# Patient Record
Sex: FEMALE | Race: BLACK OR AFRICAN AMERICAN | NOT HISPANIC OR LATINO | Employment: OTHER | URBAN - METROPOLITAN AREA
[De-identification: names, ages, dates, MRNs, and addresses within clinical notes are randomized per-mention and may not be internally consistent; named-entity substitution may affect disease eponyms.]

---

## 2017-05-11 ENCOUNTER — APPOINTMENT (OUTPATIENT)
Dept: CT IMAGING | Facility: CLINIC | Age: 67
DRG: 581 | End: 2017-05-11
Attending: EMERGENCY MEDICINE
Payer: COMMERCIAL

## 2017-05-11 ENCOUNTER — HOSPITAL ENCOUNTER (INPATIENT)
Facility: CLINIC | Age: 67
LOS: 2 days | Discharge: HOME | DRG: 581 | End: 2017-05-13
Attending: EMERGENCY MEDICINE | Admitting: INTERNAL MEDICINE
Payer: COMMERCIAL

## 2017-05-11 ENCOUNTER — APPOINTMENT (OUTPATIENT)
Dept: GENERAL RADIOLOGY | Facility: CLINIC | Age: 67
DRG: 581 | End: 2017-05-11
Attending: EMERGENCY MEDICINE
Payer: COMMERCIAL

## 2017-05-11 DIAGNOSIS — I48.91 ATRIAL FIBRILLATION, NEW ONSET (H): ICD-10-CM

## 2017-05-11 DIAGNOSIS — J98.59 MEDIASTINAL MASS: ICD-10-CM

## 2017-05-11 LAB
ALBUMIN SERPL-MCNC: 3.4 G/DL (ref 3.4–5)
ALP SERPL-CCNC: 68 U/L (ref 40–150)
ALT SERPL W P-5'-P-CCNC: 12 U/L (ref 0–50)
ANION GAP SERPL CALCULATED.3IONS-SCNC: 6 MMOL/L (ref 3–14)
AST SERPL W P-5'-P-CCNC: 11 U/L (ref 0–45)
BASOPHILS # BLD AUTO: 0 10E9/L (ref 0–0.2)
BASOPHILS NFR BLD AUTO: 0.6 %
BILIRUB SERPL-MCNC: 0.3 MG/DL (ref 0.2–1.3)
BUN SERPL-MCNC: 13 MG/DL (ref 7–30)
CALCIUM SERPL-MCNC: 9.1 MG/DL (ref 8.5–10.1)
CHLORIDE SERPL-SCNC: 109 MMOL/L (ref 94–109)
CO2 SERPL-SCNC: 27 MMOL/L (ref 20–32)
CREAT SERPL-MCNC: 0.6 MG/DL (ref 0.52–1.04)
DIFFERENTIAL METHOD BLD: NORMAL
EOSINOPHIL # BLD AUTO: 0.3 10E9/L (ref 0–0.7)
EOSINOPHIL NFR BLD AUTO: 5.3 %
ERYTHROCYTE [DISTWIDTH] IN BLOOD BY AUTOMATED COUNT: 12.6 % (ref 10–15)
GFR SERPL CREATININE-BSD FRML MDRD: ABNORMAL ML/MIN/1.7M2
GLUCOSE BLDC GLUCOMTR-MCNC: 145 MG/DL (ref 70–99)
GLUCOSE SERPL-MCNC: 148 MG/DL (ref 70–99)
HCT VFR BLD AUTO: 42.1 % (ref 35–47)
HGB BLD-MCNC: 14.3 G/DL (ref 11.7–15.7)
IMM GRANULOCYTES # BLD: 0 10E9/L (ref 0–0.4)
IMM GRANULOCYTES NFR BLD: 0.2 %
INR PPP: 0.95 (ref 0.86–1.14)
LYMPHOCYTES # BLD AUTO: 2.8 10E9/L (ref 0.8–5.3)
LYMPHOCYTES NFR BLD AUTO: 50.6 %
MCH RBC QN AUTO: 31 PG (ref 26.5–33)
MCHC RBC AUTO-ENTMCNC: 34 G/DL (ref 31.5–36.5)
MCV RBC AUTO: 91 FL (ref 78–100)
MONOCYTES # BLD AUTO: 0.4 10E9/L (ref 0–1.3)
MONOCYTES NFR BLD AUTO: 7.2 %
NEUTROPHILS # BLD AUTO: 2 10E9/L (ref 1.6–8.3)
NEUTROPHILS NFR BLD AUTO: 36.1 %
NRBC # BLD AUTO: 0 10*3/UL
NRBC BLD AUTO-RTO: 0 /100
NT-PROBNP SERPL-MCNC: 2069 PG/ML (ref 0–900)
PLATELET # BLD AUTO: 205 10E9/L (ref 150–450)
POTASSIUM SERPL-SCNC: 4.1 MMOL/L (ref 3.4–5.3)
PROT SERPL-MCNC: 7.5 G/DL (ref 6.8–8.8)
RBC # BLD AUTO: 4.62 10E12/L (ref 3.8–5.2)
SODIUM SERPL-SCNC: 142 MMOL/L (ref 133–144)
TROPONIN I SERPL-MCNC: NORMAL UG/L (ref 0–0.04)
TSH SERPL DL<=0.05 MIU/L-ACNC: 0.99 MU/L (ref 0.4–4)
WBC # BLD AUTO: 5.5 10E9/L (ref 4–11)

## 2017-05-11 PROCEDURE — 71260 CT THORAX DX C+: CPT

## 2017-05-11 PROCEDURE — 12000001 ZZH R&B MED SURG/OB UMMC

## 2017-05-11 PROCEDURE — 83880 ASSAY OF NATRIURETIC PEPTIDE: CPT | Performed by: EMERGENCY MEDICINE

## 2017-05-11 PROCEDURE — 93005 ELECTROCARDIOGRAM TRACING: CPT | Performed by: EMERGENCY MEDICINE

## 2017-05-11 PROCEDURE — 99222 1ST HOSP IP/OBS MODERATE 55: CPT | Mod: AI | Performed by: INTERNAL MEDICINE

## 2017-05-11 PROCEDURE — 25000128 H RX IP 250 OP 636: Performed by: EMERGENCY MEDICINE

## 2017-05-11 PROCEDURE — 85025 COMPLETE CBC W/AUTO DIFF WBC: CPT | Performed by: EMERGENCY MEDICINE

## 2017-05-11 PROCEDURE — 85610 PROTHROMBIN TIME: CPT | Performed by: EMERGENCY MEDICINE

## 2017-05-11 PROCEDURE — 84443 ASSAY THYROID STIM HORMONE: CPT | Performed by: EMERGENCY MEDICINE

## 2017-05-11 PROCEDURE — 99285 EMERGENCY DEPT VISIT HI MDM: CPT | Mod: 25 | Performed by: EMERGENCY MEDICINE

## 2017-05-11 PROCEDURE — 25500064 ZZH RX 255 OP 636: Performed by: EMERGENCY MEDICINE

## 2017-05-11 PROCEDURE — 00000146 ZZHCL STATISTIC GLUCOSE BY METER IP

## 2017-05-11 PROCEDURE — 80053 COMPREHEN METABOLIC PANEL: CPT | Performed by: EMERGENCY MEDICINE

## 2017-05-11 PROCEDURE — 84484 ASSAY OF TROPONIN QUANT: CPT | Performed by: EMERGENCY MEDICINE

## 2017-05-11 PROCEDURE — 99285 EMERGENCY DEPT VISIT HI MDM: CPT | Mod: Z6 | Performed by: EMERGENCY MEDICINE

## 2017-05-11 PROCEDURE — 71020 XR CHEST 2 VW: CPT

## 2017-05-11 PROCEDURE — 25000125 ZZHC RX 250: Performed by: EMERGENCY MEDICINE

## 2017-05-11 RX ORDER — AMOXICILLIN 250 MG
1-2 CAPSULE ORAL 2 TIMES DAILY PRN
Status: DISCONTINUED | OUTPATIENT
Start: 2017-05-11 | End: 2017-05-13 | Stop reason: HOSPADM

## 2017-05-11 RX ORDER — NICOTINE POLACRILEX 4 MG
15-30 LOZENGE BUCCAL
Status: DISCONTINUED | OUTPATIENT
Start: 2017-05-11 | End: 2017-05-13 | Stop reason: HOSPADM

## 2017-05-11 RX ORDER — NALOXONE HYDROCHLORIDE 0.4 MG/ML
.1-.4 INJECTION, SOLUTION INTRAMUSCULAR; INTRAVENOUS; SUBCUTANEOUS
Status: DISCONTINUED | OUTPATIENT
Start: 2017-05-11 | End: 2017-05-13 | Stop reason: HOSPADM

## 2017-05-11 RX ORDER — ACETAMINOPHEN 325 MG/1
650 TABLET ORAL EVERY 4 HOURS PRN
Status: DISCONTINUED | OUTPATIENT
Start: 2017-05-11 | End: 2017-05-13 | Stop reason: HOSPADM

## 2017-05-11 RX ORDER — DEXTROSE MONOHYDRATE 25 G/50ML
25-50 INJECTION, SOLUTION INTRAVENOUS
Status: DISCONTINUED | OUTPATIENT
Start: 2017-05-11 | End: 2017-05-13 | Stop reason: HOSPADM

## 2017-05-11 RX ORDER — ONDANSETRON 2 MG/ML
4 INJECTION INTRAMUSCULAR; INTRAVENOUS EVERY 6 HOURS PRN
Status: DISCONTINUED | OUTPATIENT
Start: 2017-05-11 | End: 2017-05-13 | Stop reason: HOSPADM

## 2017-05-11 RX ORDER — ONDANSETRON 4 MG/1
4 TABLET, ORALLY DISINTEGRATING ORAL EVERY 6 HOURS PRN
Status: DISCONTINUED | OUTPATIENT
Start: 2017-05-11 | End: 2017-05-13 | Stop reason: HOSPADM

## 2017-05-11 RX ORDER — IOPAMIDOL 755 MG/ML
100 INJECTION, SOLUTION INTRAVASCULAR ONCE
Status: COMPLETED | OUTPATIENT
Start: 2017-05-11 | End: 2017-05-11

## 2017-05-11 RX ORDER — BISACODYL 10 MG
10 SUPPOSITORY, RECTAL RECTAL DAILY PRN
Status: DISCONTINUED | OUTPATIENT
Start: 2017-05-11 | End: 2017-05-13 | Stop reason: HOSPADM

## 2017-05-11 RX ORDER — SODIUM CHLORIDE 9 MG/ML
1000 INJECTION, SOLUTION INTRAVENOUS CONTINUOUS
Status: DISCONTINUED | OUTPATIENT
Start: 2017-05-11 | End: 2017-05-13 | Stop reason: HOSPADM

## 2017-05-11 RX ADMIN — SODIUM CHLORIDE 80 ML: 9 INJECTION, SOLUTION INTRAVENOUS at 20:25

## 2017-05-11 RX ADMIN — SODIUM CHLORIDE 500 ML: 9 INJECTION, SOLUTION INTRAVENOUS at 19:35

## 2017-05-11 RX ADMIN — IOPAMIDOL 92 ML: 755 INJECTION, SOLUTION INTRAVENOUS at 20:24

## 2017-05-11 NOTE — IP AVS SNAPSHOT
[address] [address]    Phone:  [phone]                                       After Visit Summary   5/11/2017    Jf Schulz    MRN: [id number]           After Visit Summary Signature Page     I have received my discharge instructions, and my questions have been answered. I have discussed any challenges I see with this plan with the nurse or doctor.    ..........................................................................................................................................  Patient/Patient Representative Signature      ..........................................................................................................................................  Patient Representative Print Name and Relationship to Patient    ..................................................               ................................................  Date                                            Time    ..........................................................................................................................................  Reviewed by Signature/Title    ...................................................              ..............................................  Date                                                            Time

## 2017-05-11 NOTE — IP AVS SNAPSHOT
MRN:[id number]                      After Visit Summary   5/11/2017    Jf Schulz    MRN: [id number]           Thank you!     Thank you for choosing [facility] for your care. Our goal is always to provide you with excellent care. Hearing back from our patients is one way we can continue to improve our services. Please take a few minutes to complete the written survey that you may receive in the mail after you visit with us. Thank you!        Patient Information     Date Of Birth          1950        Designated Caregiver       Most Recent Value    Caregiver    Will someone help with your care after discharge? yes    Name of designated caregiver Elton Payne    Phone number of caregiver [phone]    Caregiver address [address], [place] [address]      About your hospital stay     You were admitted on:  May 11, 2017 You last received care in the:  [address] [facility]    You were discharged on:  May 13, 2017        Reason for your hospital stay       You were hospitalized for a mass that was discovered in your mediastinum (middle of your chest, not in lungs). You were also noted to have an irregular heart rhythm called atrial fibrillation. A sample biopsy was taken of the mass (further studies from this are pending). You were started on a new medication to control your heart rate.                  Who to Call     For medical emergencies, please call 911.  For non-urgent questions about your medical care, please call your primary care provider or clinic, [phone]  For questions related to your surgery, please call your surgery clinic        Attending Provider     Provider Specialty    Dmitriy Pérez MD Emergency Medicine    Ravindra Mondragon MD Internal Medicine    Gorge Celeste MD Pediatrics       Primary Care Provider Office Phone # Fax #    Samuel Dubose [phone]       [facility] [address] [address]         When to contact  your care team       If you are feeling unwell or have new or concerning symptoms, contact your primary care doctor. If you have shortness of breath, chest pain, fevers and chills, come to emergency department                  After Care Instructions     Activity       Your activity upon discharge as tolerated            Diet       Follow this diet upon discharge: Regular diabetic diet            Discharge Instructions       1. Follow up appointments as above   2. Continue all previous home medications   3. New medication for heart rhythm is in discharge medications (metoprolol and you can  at [place] [facility]).                  Follow-up Appointments     Follow Up and recommended labs and tests       1. Follow up on Tuesday with your primary care provider Dr. Samuel Dubose at [facility]. He should get Info[employer] to get your laboratory results and help set up follow-up as needed.     2. The infectious disease doctors will follow the lab sample results at the [facility] and will contact you as well if something returns concerning for infection. You will be scheduled up for follow up with infectious disease as needed.     3. You should discuss new diagnosis of atrial fibrillation with your primary doctor. We discussed that there is a risk of stroke due to blood clots that can form in the heart with this abnormal heart rhythm. Given that we do not know definitely what the mass is in your chest, we would like to have a plan for this before considering starting blood thinners.      You were started on a medication to slow down your heart as sometimes your irregular heart rhythm can speed up and make you feel unwell.                  Pending Results     Date and Time Order Name Status Description    5/12/2017 1528 Nocardia culture In process     5/12/2017 1528 Fungus Culture, non-blood In process     5/12/2017 1528 AFB Stain Non Blood Preliminary     5/12/2017 1528 AFB Culture Non Blood  "Preliminary     2017 1509 Fluid Culture Aerobic Bacterial Preliminary     2017 0920 M Tuberculosis by Quantiferon ! Follow QTB collection process In process             Statement of Approval     Ordered          17 2066  I have reviewed and agree with all the recommendations and orders detailed in this document.  EFFECTIVE NOW     Approved and electronically signed by:  Dinesh Florian MD             Admission Information     Date & Time Provider Department Dept. Phone    2017 Gorge Celeste MD [address] [facility] [phone]      Your Vitals Were     Blood Pressure Pulse Temperature Respirations Height Weight    128/71 (BP Location: Left arm) 76 97  F (36.1  C) (Oral) 16 1.702 m (5' 7\") 79.4 kg (175 lb 1.6 oz)    Pulse Oximetry BMI (Body Mass Index)                97% 27.42 kg/m2          MyChart Information     [employer] lets you send messages to your doctor, view your test results, renew your prescriptions, schedule appointments and more. To sign up, go to [email or web address] . Click on \"Log in\" on the left side of the screen, which will take you to the Welcome page. Then click on \"Sign up Now\" on the right side of the page.     You will be asked to enter the access code listed below, as well as some personal information. Please follow the directions to create your username and password.     Your access code is: [email or web address]  Expires: 8/10/2017  2:58 PM     Your access code will  in 90 days. If you need help or a new code, please call your [place] clinic or [phone].        Care EveryWhere ID     This is your Care EveryWhere ID. This could be used by other organizations to access your [facility] medical records  [id number]           Review of your medicines      START taking        Dose / Directions    metoprolol 25 MG tablet   Commonly known as:  LOPRESSOR   Used for:  Atrial fibrillation, new onset (H)        Dose:  12.5 mg   Take 0.5 tablets (12.5 mg) by mouth 2 times " daily If well tolerated, should have further medication prescribed from primary doctor   Quantity:  30 tablet   Refills:  0         CONTINUE these medicines which have NOT CHANGED        Dose / Directions    insulin glargine 100 UNIT/ML injection   Commonly known as:  LANTUS        Dose:  10 Units   Inject 10 Units Subcutaneous At Bedtime   Refills:  0       METFORMIN HCL PO        Dose:  1000 mg   Take 1,000 mg by mouth daily (with breakfast)   Refills:  0       SIMVASTATIN PO        Dose:  10 mg   Take 10 mg by mouth At Bedtime   Refills:  0       vitamin D 42725 UNIT capsule   Commonly known as:  ERGOCALCIFEROL        Dose:  01514 Units   Take 50,000 Units by mouth Every Mon, Wed, Fri Morning   Refills:  0            Where to get your medicines      [facility] [place], [place] - [address]  [address] [facility] [address]     Phone:  [phone]     metoprolol 25 MG tablet                Protect others around you: Learn how to safely use, store and throw away your medicines at www.disposemymeds.org.             Medication List: This is a list of all your medications and when to take them. Check marks below indicate your daily home schedule. Keep this list as a reference.      Medications           Morning Afternoon Evening Bedtime As Needed    insulin glargine 100 UNIT/ML injection   Commonly known as:  LANTUS   Inject 10 Units Subcutaneous At Bedtime                                METFORMIN HCL PO   Take 1,000 mg by mouth daily (with breakfast)                                metoprolol 25 MG tablet   Commonly known as:  LOPRESSOR   Take 0.5 tablets (12.5 mg) by mouth 2 times daily If well tolerated, should have further medication prescribed from primary doctor   Last time this was given:  12.5 mg on 5/13/2017  9:04 AM                                SIMVASTATIN PO   Take 10 mg by mouth At Bedtime                                vitamin D 37066  UNIT capsule   Commonly known as:  ERGOCALCIFEROL   Take 50,000 Units by mouth Every Mon, Wed, Fri Morning

## 2017-05-11 NOTE — LETTER
[address] [facility]  [address] [address] [address]  Phone: [phone]    May 13, 2017        Jf Schulz  [address] [facility] [address]          To whom it may concern:    RE: Jf Schulz, mother of      Was hospitalized from 5/12/17 to 5/13/17. Her son was present during her care. This note should be presented to his work regarding absence given family situation.     Please contact me for questions or concerns.      Sincerely,      Dr. Maribeth Thompson

## 2017-05-11 NOTE — ED PROVIDER NOTES
History     Chief Complaint   Patient presents with     Palpitations     sent from [facility] for eval of irregular heartbeat. Pt c/o palpitations.     HPI  Jf Schulz is a 67 year old [place] female who presents with her daughter to the ER for evaluation.  The patient apparently was seen in the clinic earlier today and was found to be in atrial fibrillation and was sent here to the ER for further evaluation.  The patient has no previous history of atrial fibrillation.  The patient s history is one of diabetes but no previous heart history.  The patient states that she gets intermittent palpitations and states that this has been ongoing for the last year.  Patient states that she is mildly short of breath and has some slight LUJAN.  The patient states that some of these symptoms have been worsening over the last month.  The patient denies any chest pain or pressure currently.  Patient denies any vomiting, diarrhea, melena or bright blood per rectum.    I have reviewed the Medications, Allergies, Past Medical and Surgical History, and Social History in the Epic system.    History reviewed. No pertinent past medical history.    History reviewed. No pertinent surgical history.    No family history on file.    Social History   Substance Use Topics     Smoking status: Never Smoker     Smokeless tobacco: Not on file     Alcohol use No     Previous Medications    INSULIN GLARGINE (LANTUS) 100 UNIT/ML INJECTION    Inject 100 Units Subcutaneous At Bedtime    METFORMIN HCL PO    Take 1,000 mg by mouth 2 times daily (with meals)    PANTOPRAZOLE SODIUM PO    Take 60 mg by mouth    SIMVASTATIN PO    Take 10 mg by mouth At Bedtime      No Known Allergies    Review of Systems   All other systems reviewed and are negative.         Physical Exam   BP: 119/68  Pulse: 69 ( per pulse ox)  Temp: 94  F (34.4  C)  Resp: 16  SpO2: 99 %  Physical Exam   Constitutional: She is oriented to person, place, and time.   Elderly alert  conversant   HENT:   Head: Atraumatic.   Eyes: EOM are normal. Pupils are equal, round, and reactive to light.   Neck: Neck supple.   Cardiovascular:   Irregular   Pulmonary/Chest: Breath sounds normal.   Slight crackles in the bases   Abdominal: Soft. There is no tenderness. There is no rebound.   Musculoskeletal: She exhibits edema (trace). She exhibits no tenderness or deformity.   Neurological: She is alert and oriented to person, place, and time. No cranial nerve deficit.   Grossly intact and symmetric   Skin: Skin is warm.   Psychiatric: She has a normal mood and affect.       ED Course     ED Course     Procedures          Patient was placed on cardiac monitor and oximetry.    EKG revealed atrial fibrillation at a rate of 94 with no acute ST changes for ischemia.  This was read by me personally.  No old EKGs were available for review.    IV was established for blood draw and medication administration.    Chest x-ray was done and revealed a slightly widened mediastinum.  Chest CT was subsequently done to rule out ascending aortic dissection or aneurysm.    Results for orders placed or performed during the hospital encounter of 05/11/17   XR Chest 2 Views    Narrative    CHEST TWO VIEWS  5/11/2017 7:01 PM     HISTORY: Chest pain.    COMPARISON: None.      Impression    IMPRESSION: Mild elevation of right diaphragm. Poor visualization of  the right costophrenic sulcus on lateral view. Normal heart size and  pulmonary vascularity. Question of retrocardiac mass or dilatation of  the left atrium with elevation of the right mainstem bronchus.     JOVON MORTENSEN MD   Chest CT w IV contrast only, TRAUMA / DISSECTION    Narrative    CT CHEST WITH CONTRAST  5/11/2017 8:27 PM     HISTORY:   Chest pain and enlarged mediastinum on x-ray. Evaluate for  dissection.    TECHNIQUE:    Helical axial scans from lung apices through lung bases  with Isovue 370, 92 mL IV contrast. Radiation dose for this scan was  reduced using  automated exposure control, adjustment of the mA and/or  kV according to patient size, or iterative reconstruction technique.    COMPARISON:    None.    FINDINGS:    There is no evidence for aortic dissection or pulmonary  embolism. There is a large subcarinal mass lesion measuring up to 3.9  x 5.3 x 5.0 cm. Mass density is 25 Hounsfield units so it is not a  simple benign cyst. It causes mass effect on the posterior aspect of  the right main pulmonary artery with mild narrowing of this vessel.  The lesion also causes mass effect on the superior left atrium. No  other mediastinal mass lesions or adenopathy. Small amount of fibrosis  or platelike atelectasis posterior right lung base. The lungs are  otherwise clear. Visualized upper abdomen shows a probable 2 cm benign  cyst in the upper pole of the left kidney.      Impression    IMPRESSION:  1. No evidence for aortic dissection or other acute vascular  abnormality of the chest.  2. Large subcarinal soft tissue mass which is suspicious for neoplasm.  3. Probable 2 cm benign cyst upper pole left kidney.    CBC with platelets differential   Result Value Ref Range    WBC 5.5 4.0 - 11.0 10e9/L    RBC Count 4.62 3.8 - 5.2 10e12/L    Hemoglobin 14.3 11.7 - 15.7 g/dL    Hematocrit 42.1 35.0 - 47.0 %    MCV 91 78 - 100 fl    MCH 31.0 26.5 - 33.0 pg    MCHC 34.0 31.5 - 36.5 g/dL    RDW 12.6 10.0 - 15.0 %    Platelet Count 205 150 - 450 10e9/L    Diff Method Automated Method     % Neutrophils 36.1 %    % Lymphocytes 50.6 %    % Monocytes 7.2 %    % Eosinophils 5.3 %    % Basophils 0.6 %    % Immature Granulocytes 0.2 %    Nucleated RBCs 0 0 /100    Absolute Neutrophil 2.0 1.6 - 8.3 10e9/L    Absolute Lymphocytes 2.8 0.8 - 5.3 10e9/L    Absolute Monocytes 0.4 0.0 - 1.3 10e9/L    Absolute Eosinophils 0.3 0.0 - 0.7 10e9/L    Absolute Basophils 0.0 0.0 - 0.2 10e9/L    Abs Immature Granulocytes 0.0 0 - 0.4 10e9/L    Absolute Nucleated RBC 0.0    Troponin I   Result Value Ref Range     Troponin I ES  0.000 - 0.045 ug/L     <0.015  The 99th percentile for upper reference range is 0.045 ug/L.  Troponin values in   the range of 0.045 - 0.120 ug/L may be associated with risks of adverse   clinical events.     INR   Result Value Ref Range    INR 0.95 0.86 - 1.14   Comprehensive metabolic panel   Result Value Ref Range    Sodium 142 133 - 144 mmol/L    Potassium 4.1 3.4 - 5.3 mmol/L    Chloride 109 94 - 109 mmol/L    Carbon Dioxide 27 20 - 32 mmol/L    Anion Gap 6 3 - 14 mmol/L    Glucose 148 (H) 70 - 99 mg/dL    Urea Nitrogen 13 7 - 30 mg/dL    Creatinine 0.60 0.52 - 1.04 mg/dL    GFR Estimate >90  Non  GFR Calc   >60 mL/min/1.7m2    GFR Estimate If Black >90   GFR Calc   >60 mL/min/1.7m2    Calcium 9.1 8.5 - 10.1 mg/dL    Bilirubin Total 0.3 0.2 - 1.3 mg/dL    Albumin 3.4 3.4 - 5.0 g/dL    Protein Total 7.5 6.8 - 8.8 g/dL    Alkaline Phosphatase 68 40 - 150 U/L    ALT 12 0 - 50 U/L    AST 11 0 - 45 U/L   Nt probnp inpatient (BNP)   Result Value Ref Range    N-Terminal Pro BNP Inpatient 2069 (H) 0 - 900 pg/mL   TSH   Result Value Ref Range    TSH 0.99 0.40 - 4.00 mU/L   Glucose by meter   Result Value Ref Range    Glucose 145 (H) 70 - 99 mg/dL   EKG 12 lead   Result Value Ref Range    Interpretation ECG Click View Image link to view waveform and result        Labs Ordered and Resulted from Time of ED Arrival Up to the Time of Departure from the ED   COMPREHENSIVE METABOLIC PANEL - Abnormal; Notable for the following:        Result Value    Glucose 148 (*)     All other components within normal limits   NT PROBNP INPATIENT - Abnormal; Notable for the following:     N-Terminal Pro BNP Inpatient 2069 (*)     All other components within normal limits   GLUCOSE BY METER - Abnormal; Notable for the following:     Glucose 145 (*)     All other components within normal limits   CBC WITH PLATELETS DIFFERENTIAL   TROPONIN I   INR   TSH   GLUCOSE MONITOR NURSING POCT   DRUG  ABUSE SCREEN 6 CHEM DEP URINE ([facility])   ROUTINE UA WITH MICROSCOPIC REFLEX TO CULTURE   PULSE OXIMETRY NURSING   CARDIAC CONTINUOUS MONITORING   PERIPHERAL IV CATHETER       Assessments & Plan (with Medical Decision Making)     I have reviewed the nursing notes.    Findings of the chest CT and the EKG were discussed with family with  present.  Patient was discussed with thoracic surgery and with the medicine service on the [place].  Patient is in new onset A. fib with mild evidence of failure.  Patient will be admitted to the medicine service with most likely pulmonary, cardiology and/or thoracic surgery in consult.    I have reviewed the findings, diagnosis, and plan with the patient and the family.    Final diagnoses:   Atrial fibrillation, new onset (H)   Mediastinal mass     Dmitriy Pérez MD    I, Geno Donohue, am serving as a trained medical scribe to document services personally performed by Dmitriy Pérez MD, based on the provider's statements to me.      IDmitriy MD, was physically present and have reviewed and verified the accuracy of this note documented by Geno Donohue.       5/11/2017   [facility], [place], EMERGENCY DEPARTMENT     Dmitriy Pérez MD  05/11/17 8574

## 2017-05-12 ENCOUNTER — OFFICE VISIT (OUTPATIENT)
Dept: INTERPRETER SERVICES | Facility: CLINIC | Age: 67
End: 2017-05-12

## 2017-05-12 LAB
ALBUMIN UR-MCNC: NEGATIVE MG/DL
AMPHETAMINES UR QL SCN: NORMAL
APPEARANCE UR: CLEAR
BACTERIA SPEC CULT: NORMAL
BARBITURATES UR QL: NORMAL
BENZODIAZ UR QL: NORMAL
BILIRUB UR QL STRIP: NEGATIVE
CANNABINOIDS UR QL SCN: NORMAL
COCAINE UR QL: NORMAL
COLOR UR AUTO: YELLOW
ETHANOL UR QL SCN: NORMAL
GLUCOSE BLDC GLUCOMTR-MCNC: 108 MG/DL (ref 70–99)
GLUCOSE BLDC GLUCOMTR-MCNC: 116 MG/DL (ref 70–99)
GLUCOSE BLDC GLUCOMTR-MCNC: 137 MG/DL (ref 70–99)
GLUCOSE BLDC GLUCOMTR-MCNC: 139 MG/DL (ref 70–99)
GLUCOSE BLDC GLUCOMTR-MCNC: 162 MG/DL (ref 70–99)
GLUCOSE BLDC GLUCOMTR-MCNC: 171 MG/DL (ref 70–99)
GLUCOSE BLDC GLUCOMTR-MCNC: 282 MG/DL (ref 70–99)
GLUCOSE UR STRIP-MCNC: NEGATIVE MG/DL
GRAM STN SPEC: NORMAL
HBA1C MFR BLD: 8 % (ref 4.3–6)
HGB UR QL STRIP: NEGATIVE
INTERPRETATION ECG - MUSE: NORMAL
KETONES UR STRIP-MCNC: 5 MG/DL
KOH PREP SPEC: NORMAL
LACTATE BLD-SCNC: 1 MMOL/L (ref 0.7–2.1)
LDH SERPL L TO P-CCNC: 138 U/L (ref 81–234)
LEUKOCYTE ESTERASE UR QL STRIP: NEGATIVE
MICRO REPORT STATUS: NORMAL
NITRATE UR QL: NEGATIVE
OPIATES UR QL SCN: NORMAL
PH UR STRIP: 5.5 PH (ref 5–7)
RBC #/AREA URNS AUTO: <1 /HPF (ref 0–2)
SP GR UR STRIP: 1.02 (ref 1–1.03)
SPECIMEN SOURCE: NORMAL
SQUAMOUS #/AREA URNS AUTO: <1 /HPF (ref 0–1)
TRANS CELLS #/AREA URNS HPF: <1 /HPF (ref 0–1)
URN SPEC COLLECT METH UR: ABNORMAL
UROBILINOGEN UR STRIP-MCNC: NORMAL MG/DL (ref 0–2)
WBC #/AREA URNS AUTO: 2 /HPF (ref 0–2)

## 2017-05-12 PROCEDURE — 36415 COLL VENOUS BLD VENIPUNCTURE: CPT | Performed by: INTERNAL MEDICINE

## 2017-05-12 PROCEDURE — 80320 DRUG SCREEN QUANTALCOHOLS: CPT | Performed by: EMERGENCY MEDICINE

## 2017-05-12 PROCEDURE — 00000155 ZZHCL STATISTIC H-CELL BLOCK W/STAIN: Performed by: INTERNAL MEDICINE

## 2017-05-12 PROCEDURE — 25000132 ZZH RX MED GY IP 250 OP 250 PS 637: Performed by: INTERNAL MEDICINE

## 2017-05-12 PROCEDURE — 83036 HEMOGLOBIN GLYCOSYLATED A1C: CPT | Performed by: INTERNAL MEDICINE

## 2017-05-12 PROCEDURE — 81001 URINALYSIS AUTO W/SCOPE: CPT | Performed by: EMERGENCY MEDICINE

## 2017-05-12 PROCEDURE — 80307 DRUG TEST PRSMV CHEM ANLYZR: CPT | Performed by: EMERGENCY MEDICINE

## 2017-05-12 PROCEDURE — 31652 BRONCH EBUS SAMPLNG 1/2 NODE: CPT | Performed by: INTERNAL MEDICINE

## 2017-05-12 PROCEDURE — 00000146 ZZHCL STATISTIC GLUCOSE BY METER IP

## 2017-05-12 PROCEDURE — 87015 SPECIMEN INFECT AGNT CONCNTJ: CPT | Performed by: INTERNAL MEDICINE

## 2017-05-12 PROCEDURE — 99233 SBSQ HOSP IP/OBS HIGH 50: CPT | Mod: GC | Performed by: PEDIATRICS

## 2017-05-12 PROCEDURE — 25000128 H RX IP 250 OP 636: Performed by: INTERNAL MEDICINE

## 2017-05-12 PROCEDURE — T1013 SIGN LANG/ORAL INTERPRETER: HCPCS | Mod: U3

## 2017-05-12 PROCEDURE — 40000588 ZZH STATISTIC BRONCH IN ENDO (20 MIN) THERAPIST TIME

## 2017-05-12 PROCEDURE — 86480 TB TEST CELL IMMUN MEASURE: CPT | Performed by: PEDIATRICS

## 2017-05-12 PROCEDURE — 87116 MYCOBACTERIA CULTURE: CPT | Performed by: INTERNAL MEDICINE

## 2017-05-12 PROCEDURE — 87210 SMEAR WET MOUNT SALINE/INK: CPT | Performed by: INTERNAL MEDICINE

## 2017-05-12 PROCEDURE — 87205 SMEAR GRAM STAIN: CPT | Performed by: INTERNAL MEDICINE

## 2017-05-12 PROCEDURE — G0500 MOD SEDAT ENDO SERVICE >5YRS: HCPCS | Performed by: INTERNAL MEDICINE

## 2017-05-12 PROCEDURE — 87070 CULTURE OTHR SPECIMN AEROBIC: CPT | Performed by: INTERNAL MEDICINE

## 2017-05-12 PROCEDURE — 88172 CYTP DX EVAL FNA 1ST EA SITE: CPT | Performed by: INTERNAL MEDICINE

## 2017-05-12 PROCEDURE — 12000008 ZZH R&B INTERMEDIATE UMMC

## 2017-05-12 PROCEDURE — 36415 COLL VENOUS BLD VENIPUNCTURE: CPT | Performed by: PEDIATRICS

## 2017-05-12 PROCEDURE — 88305 TISSUE EXAM BY PATHOLOGIST: CPT | Performed by: INTERNAL MEDICINE

## 2017-05-12 PROCEDURE — 25000128 H RX IP 250 OP 636: Performed by: EMERGENCY MEDICINE

## 2017-05-12 PROCEDURE — 25000131 ZZH RX MED GY IP 250 OP 636 PS 637: Performed by: INTERNAL MEDICINE

## 2017-05-12 PROCEDURE — 83615 LACTATE (LD) (LDH) ENZYME: CPT | Performed by: INTERNAL MEDICINE

## 2017-05-12 PROCEDURE — 87102 FUNGUS ISOLATION CULTURE: CPT | Performed by: INTERNAL MEDICINE

## 2017-05-12 PROCEDURE — 87206 SMEAR FLUORESCENT/ACID STAI: CPT | Performed by: INTERNAL MEDICINE

## 2017-05-12 PROCEDURE — 83605 ASSAY OF LACTIC ACID: CPT | Performed by: INTERNAL MEDICINE

## 2017-05-12 PROCEDURE — 88173 CYTOPATH EVAL FNA REPORT: CPT | Performed by: INTERNAL MEDICINE

## 2017-05-12 PROCEDURE — 07B74ZX EXCISION OF THORAX LYMPHATIC, PERCUTANEOUS ENDOSCOPIC APPROACH, DIAGNOSTIC: ICD-10-PCS | Performed by: INTERNAL MEDICINE

## 2017-05-12 PROCEDURE — 25000308 HC RX OP HPI UCR WEL MED 250 IP 250: Performed by: INTERNAL MEDICINE

## 2017-05-12 PROCEDURE — 25000125 ZZHC RX 250: Performed by: INTERNAL MEDICINE

## 2017-05-12 RX ORDER — LIDOCAINE HYDROCHLORIDE 40 MG/ML
INJECTION, SOLUTION RETROBULBAR PRN
Status: DISCONTINUED | OUTPATIENT
Start: 2017-05-12 | End: 2017-05-12 | Stop reason: HOSPADM

## 2017-05-12 RX ORDER — ERGOCALCIFEROL 1.25 MG/1
50000 CAPSULE, LIQUID FILLED ORAL
Status: ON HOLD | COMMUNITY
End: 2024-07-02

## 2017-05-12 RX ORDER — FENTANYL CITRATE 50 UG/ML
INJECTION, SOLUTION INTRAMUSCULAR; INTRAVENOUS PRN
Status: DISCONTINUED | OUTPATIENT
Start: 2017-05-12 | End: 2017-05-12 | Stop reason: HOSPADM

## 2017-05-12 RX ADMIN — METOPROLOL TARTRATE 12.5 MG: 25 TABLET, FILM COATED ORAL at 08:42

## 2017-05-12 RX ADMIN — INSULIN ASPART 1 UNITS: 100 INJECTION, SOLUTION INTRAVENOUS; SUBCUTANEOUS at 01:09

## 2017-05-12 RX ADMIN — INSULIN ASPART 1 UNITS: 100 INJECTION, SOLUTION INTRAVENOUS; SUBCUTANEOUS at 08:39

## 2017-05-12 RX ADMIN — SODIUM CHLORIDE 1000 ML: 9 INJECTION, SOLUTION INTRAVENOUS at 01:35

## 2017-05-12 RX ADMIN — SODIUM CHLORIDE 1000 ML: 9 INJECTION, SOLUTION INTRAVENOUS at 20:09

## 2017-05-12 RX ADMIN — METOPROLOL TARTRATE 12.5 MG: 25 TABLET, FILM COATED ORAL at 20:09

## 2017-05-12 ASSESSMENT — PAIN DESCRIPTION - DESCRIPTORS: DESCRIPTORS: ACHING;DISCOMFORT

## 2017-05-12 NOTE — PLAN OF CARE
Problem: Goal Outcome Summary  Goal: Goal Outcome Summary  Pt has been alert and oriented x4, French speaking. All communication was via interpreters. Pt has been very calm, cooperative, involved in cares, VSS, up ambulating in the hallway, denied any pain. UA and urine tox sent. Pt has been NPO since [place] for possible biopsy of subcarinal mass,  and 137 so far today. Pt currently in down for endobronchial US. Family members at the bedside, very helpful with cares.

## 2017-05-12 NOTE — PROGRESS NOTES
"CLINICAL NUTRITION SERVICES - ASSESSMENT NOTE     Nutrition Prescription    RECOMMENDATIONS FOR MDs/PROVIDERS TO ORDER:  None     Malnutrition Status:    Unable to determine     Recommendations already ordered by Registered Dietitian (RD):  Glucerna BID     Future/Additional Recommendations:  None      REASON FOR ASSESSMENT  Jf Schulz is a/an 67 year old female assessed by the dietitian for Admission Nutrition Risk Screen for unintentional loss of 10# or more in the past two months and reduced oral intake over the last month      Chart reviewed:   - immigrated to the [place] a [place] ago, does not speak english  - History of DM2,   - \"Presenting with chest pain, SOB and weight loss, ( pt was not able to quantify ), found to have atrial fibrillation and a subcarinal mass.  -  CT and CXR showed mass on L side of chest- Plan for possible IR for biopsy today.      NUTRITION HISTORY  Unable to obtain.       CURRENT NUTRITION ORDERS  Diet: NPO for IR biopsy  Intake/Tolerance: Was on a consistent carb diet yesterday.     LABS  Unremarkable BMP  A1C: 8.0 ( elevated)     MEDICATIONS  On bowel regimen and insulin     ANTHROPOMETRICS  Height: 170.2 cm (5' 7\")  Most Recent Weight: 79.4 kg (175 lb 1.6 oz) - admission wt on 5/11  IBW: 61.4 kg ( 129% IBW)   BMI: 27.42 kg /m2 - Overweight BMI 25-29.9  Weight History:   Wt Readings from Last 10 Encounters:   05/11/17 79.4 kg (175 lb 1.6 oz)       Dosing Weight: 66 kg adjusted wt from admission wt     ASSESSED NUTRITION NEEDS  Estimated Energy Needs: 1650 - 1980 kcals/day (25 - 30 kcals/kg)  Justification: Maintenance  Estimated Protein Needs:  66 - 79 grams protein/day (1 - 1.2 +  grams of pro/kg)  Justification: Maintenance and + for Repletion pending biopsy result   Estimated Fluid Needs:  (1 mL/kcal)   Justification: Maintenance    PHYSICAL FINDINGS  Extremities: No LE edema    MALNUTRITION  % Intake: Unable to assess  % Weight Loss: Unable to assess  Subcutaneous Fat Loss: Unable to " assess  Muscle Loss: Unable to assess  Fluid Accumulation/Edema: None noted  Malnutrition Diagnosis: Unable to determine due to lack of information at this time     NUTRITION DIAGNOSIS  Predicted inadequate nutrient intake related to recent chest pain / SOB as evidenced by nursing admission screen     INTERVENTIONS  Implementation  Nutrition Education: Not appropriate at this time due to patient condition   Medical food supplement therapy - Glucerna ordered BID     Goals  Patient to consume % of nutritionally adequate meal trays TID, or the equivalent with supplements/snacks.     Monitoring/Evaluation  Progress toward goals will be monitored and evaluated per protocol.    Purvi Sands RD/TONI  Pager [phone]

## 2017-05-12 NOTE — PHARMACY-ADMISSION MEDICATION HISTORY
Admission medication history interview status for the 5/11/2017 admission is complete. See Epic admission navigator for allergy information, pharmacy, prior to admission medications and immunization status.     Medication history interview sources:  Patient/family and discharge pharmacy ([facility] - [phone])    Changes made to PTA medication list (reason)  Added: Vitamin D2 - 50,000 units daily every MWF  Deleted: pantoprazole 60 mg once daily - patient no longer taking  Changed:   Metformin from 1,000 mg BID ==> 1,000 mg once daily in the morning  Insulin glargine (Lantus) from 100 units at bedtime ==> 10 units at bedtime    Additional medication history information (including reliability of information, actions taken by pharmacist):  -Patient and family were present for the interview. Patient brought in her medication bottles from home. [facility] pharmacy was also contacted to verify dose of Lantus.   -Metformin IR written for 1,000 mg twice daily but patient only takes once daily in the morning    Prior to Admission medications    Medication Sig Last Dose Taking? Auth Provider   vitamin D (ERGOCALCIFEROL) 64191 UNIT capsule Take 50,000 Units by mouth Every Mon, Wed, Fri Morning Past Week at Wednesday Yes Unknown, Entered By History   METFORMIN HCL PO Take 1,000 mg by mouth once daily (with meals) 5/11/2017 at AM Yes Unknown, Entered By History   insulin glargine (LANTUS) 100 UNIT/ML injection Inject 10 Units Subcutaneous At Bedtime  5/10/2017 at PM Yes Reported, Patient   SIMVASTATIN PO Take 10 mg by mouth At Bedtime 5/10/2017 at PM Yes Reported, Patient           Medication history completed by: Adan Peña, Pharmacy Intern

## 2017-05-12 NOTE — CONSULTS
[facility]  Pulmonary Consult Initial Note  May 12, 2017      Jf Schulz MRN# [id number]   Age: 67 year old YOB: 1950     Date of Admission: 5/11/2017  Reason for Consultation: Subcarinal mass  Requesting Physician: Dr. Clementine Regalado, [facility]    Primary care provider: Samuel Dubose     Assessment and Plan:  Ms. Jf Schulz is a 67-year old [place] female (immigrated to the [place] 1 year ago) with history of DM II, presenting with chest pain, dyspnea, and weight loss. She was found to have atrial fibrillation and a 5.3 cm subcarinal mass on CT chest, admitted on 5/11. Pulmonary consulted for biopsy of the subcarinal mass.        RECOMMENDATIONS:  1. Bronchoscopy with biopsy today of the subcarinal mass.  2. Await pathology results. If consistent with malignancy, recommend Heme-Onc Consultation.     We will continue to follow with you peripherally. Please do not hesitate to contact us with questions.     Staffed with Dr. Medeiros.     Fiona Tomas MD PGY-5  Pulmonary & Critical Care Fellow            Chief Complaint:     History obtained from patient and chart review.  Pu[place]ican  was present.     History of Present Illness:   Ms. Jf Schulz is a 67-year old [place] female (immigrated to the [place] 1 [place] ago) with history of DM II, presenting with chest pain, dyspnea, and weight loss. She was found to have atrial fibrillation and a 5.3 cm subcarinal mass on CT chest, admitted on 5/11. Pulmonary consulted for biopsy of the subcarinal mass.                She reports being healthy until about 1 month ago when she noticed pulling, mild, intermittent left-sided chest pain. She also has noted shortness of breath with her usual activities. No cough or fever. She reports weight loss, but cannot give any estimate. No sick contacts, rash, or lumps. No recent travels since arrival ~1 year ago. She reports palpitations for ~1 year. No TB.             Due to progressive chest  pain, she has decided to be evaluated. She initially presented to [place] where an EKG revealed atrial fibrillation and her CXR showed mediastinal widening. She had a CT chest to evaluate aortic dissection that showed a large subcarinal lesion measuring up to 5.4 cm. It causes mass effect on the posterior aspect of the right main pulmonary artery with mild narrowing of this vessel. The lesion also causes mass effect on the superior left atrium. No  other mediastinal mass lesions or adenopathy.           Past Medical History:   DM II           Past Surgical History:   None         Social History:     Social History     Social History     Marital status:      Spouse name: N/A     Number of children: N/A     Years of education: N/A     Occupational History     Not on file.     Social History Main Topics     Smoking status: Never Smoker     Smokeless tobacco: Not on file     Alcohol use No     Drug use: No     Sexual activity: Not on file     Other Topics Concern     Not on file     Social History Narrative     No narrative on file      and lives with her children. No tobacco or alcohol use.          Family History:   No history of lung cancers.          Allergies:    No Known Allergies         Medications:       metoprolol  12.5 mg Oral BID     sodium chloride (PF)  3 mL Intracatheter Q8H     insulin aspart  1-6 Units Subcutaneous Q4H     sodium chloride (PF), naloxone, bisacodyl, senna-docusate, ondansetron **OR** ondansetron, acetaminophen, glucose **OR** dextrose **OR** glucagon         Review of Systems:   10-systems reviewed with pertinent positives and negatives mentioned in the HPI.          Physical Exam:   Temp:  [94  F (34.4  C)-96.7  F (35.9  C)] 96.5  F (35.8  C)  Pulse:  [69-76] 76  Heart Rate:  [] 76  Resp:  [13-22] 16  BP: (114-127)/(56-80) 114/56  SpO2:  [94 %-99 %] 94 %     No intake or output data in the 24 hours ending 05/12/17 1120    Gen:   No acute distress. Alert, awake, and  oriented.     HEENT:   Anicteric sclerae. No oral lesions     Lungs:   Clear to auscultation bilaterally w/r/c.       Cardiovascular:   Regularly irregular. Normal S1 and S2. No murmur, gallop or rub.     Abdomen:   Soft, ND, NT with active BS.      Extremities:    No edema.       Neurologic:   Alert and conversant.      Skin:   Warm, dry.  No rash on exposed skin.           Data:   All laboratory and imaging data reviewed.       Sputum Cultures in the last 3 months:  No results found for: SDES No results found for: CULT        CT chest with IV contrast 5/11/17: Images reviewed.   1. No evidence for aortic dissection or other acute vascular abnormality of the chest.  2. Large subcarinal soft tissue mass which is suspicious for neoplasm.  3. Probable 2 cm benign cyst upper pole left kidney.

## 2017-05-12 NOTE — OR NURSING
Bronchoscopy with mcdowell needle completed.  Pt tolerated well. In pt called report to floor.  Pt will be change to respiratory isolation.   [place] pts nurse will get respiratory isolation room for pt.  Instructed pt to wear n95 mask for transport and until room is ready.

## 2017-05-12 NOTE — H&P
INTERNAL MEDICINE HISTORY & PHYSICAL   Jf Schulz ([id number]) admitted on 5/11/2017  Primary care provider: Samuel Dubose         Chief Complaint:     Chest pain, SOB, palpitation, weight loss         History of Present Illness   History obtained with help of patient's daughter due to language barriers.     Jf Schulz is a 67 year old Citizen of [place] female who [place] to the [place] a [place] ago with history of DM2, presenting with chest pain, SOB and weight loss, found to have atrial fibrillation and a subcarinal mass.    Patient says [place] the [place] on [place], has been relatively healthy until about 1 month ago when she gradually noticed pain (which feels more like something pulling) inside her left chest. The pain was mild, intermittent, not related to activity but worse with lying on the left side. She denies arm or leg weakness. She also started feeling short of breath with usual activities. She denies cough, fever, abdominal pain, nausea, vomiting, diarrhea, headache, dysuria or hematuria. She endorses weight loss (can't give an estimate) and excessive sweating over the past 6 months. She denies sick contacts, rash, lumps, leg swelling, and orthopnea. She has not travelled out of the country since arrival a year ago. She endorses palpitation that has been ongoing for about a year. She says she has no hx of TB, received severl vaccinations before coming to the [place] but cannot remember specifics.    With progression of the chest pain, she decided to see a doctor. She presented to [place], where her vitals, BMP and CBC were normal. However an EKG revealed atrial fibrillation and CXR showed mediastinal widening. He NT-Pro BNP was elevated to 2069 but troponin was negative. CT scan revealed a large subcarinal lesion with mass effect on the posterior aspect of the right main pulmonary artery and superior left atrium. The patient has been transferred to the [place] for further investigation.          Past  Medical History     Patient Active Problem List   Diagnosis     Atrial fibrillation (H)   Type 2 DM        Past Surgical History     None         Medications     --  Metformin 1000 mg AM  -- Insulin lantus, dose unknown  -- Simvastatin 40 mg daily         Allergies   No Known Allergies         Social History     Patient is  and lives with her children. 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 Does not smoke or use alcohol.         Family History      Reviewed, Non-contributory         Review of Systems     10 point ROS negative except as in HPI.         Vitals and Exam     Physical exam:  /62  Pulse 69  Temp 96.2  F (35.7  C) (Oral)  Resp 18  Wt 79.4 kg (175 lb 1.6 oz)  SpO2 95%  Wt Readings from Last 2 Encounters:   05/11/17 79.4 kg (175 lb 1.6 oz)       Physical Exam:   General: Pleasant female, lying in bed, NAD  HEENT: No Scleral icterus.  Cardiac: Irregularly irregular rhythm, normal rate. No m/r/g. Normal S1, S2.  Pulm: CTAB, no wheezes, or crackles. Normal respiratory effort  Abd: Soft, non distended, non tender abdomen. No hepatosplenomegaly.  Skin: No jaundice, No rash  Extremities: No LE edema  Joints: No inflammation noted on joints  Neuro: A&Ox3, no focal deficits  Psych:  Euthymic mood, full affect         Labs   CBC  Recent Labs  Lab 05/11/17  1836   WBC 5.5   RBC 4.62   HGB 14.3   HCT 42.1   MCV 91   MCH 31.0   MCHC 34.0   RDW 12.6          BMP  Recent Labs  Lab 05/11/17  1836      POTASSIUM 4.1   CHLORIDE 109   CO2 27   ANIONGAP 6   *   BUN 13   CR 0.60   GFRESTIMATED >90Non  GFR Calc   GFRESTBLACK >90African American GFR Calc   NAYELI 9.1        INR  Recent Labs  Lab 05/11/17  1836   INR 0.95       Liver panel  Recent Labs  Lab 05/11/17  1836   PROTTOTAL 7.5   ALBUMIN 3.4   BILITOTAL 0.3   ALKPHOS 68   AST 11   ALT 12       Imaging/procedure results:    Reviewed in Epic.    ASSESSMENT & PLAN :    Jf Schulz is a 67 year old [place] female who  immigrated to the [place] a [place] ago with history of DM2, presenting with chest pain, SOB and weight loss, found to have atrial fibrillation and a subcarinal mass.    # Subcarinal mass  Patient with chest pain, SOB, weight loss, night sweats and palpitation. CXR with mediastinal widening and CT with a large subcarinal mass. Strong suspicion for malignancy including lymphoma, thymoma, thyroid tumor. Could also be TB.   -- NPO at midnight  -- IR consult for biopsy  -- Lactate dehydrogenase  -- LA, UA/UCx, CBC, BMP    # Atrial fibrillation  Patient presenting with palpitation and SOB. EKG showing A fib with normal rate. Vital signs stable and normal. Unsure if related to above mass. Will require anticoagulation as CHADS2-VASc score of 3.   -- Metoprolol 12.5 mg PO BID  -- Consider anticoagulation after possible biopsy in AM    # DM2  Patient on Metformin 1000 mg AM and and Insulin Lantus of unknown dose PM.  -- SSI, will start lantus based on BG trend  -- Hypoglycemia protocol  -- HA1C  -- Ct simvastatin     FEN: NPO  Prophylaxis:  DVT: Pending biopsy  GI: None  Disposition: Pending work up  Code Status: FULL CODE    Dinesh Florian MD  Internal Medicine, PGY1  [phone]    Patient was seen and discussed with attending physician Ravindra Tolentino MD who agrees with above assessment and plan.    Physician Attestation   Ravindra BARBER saw this patient with the resident and agree with the resident s findings and plan of care as documented in the resident s note.      I personally reviewed vital signs, medications, labs and imaging.    Ravindra Mondragon  Date of Service (when I saw the patient): 5/11/17

## 2017-05-12 NOTE — PLAN OF CARE
Problem: Goal Outcome Summary  Goal: Goal Outcome Summary  Outcome: No Change  Per Dr Maribeth Thompson (M1), pt does not need to be on Resp Iso at this time. Infectious MD consulted. Will cont to monitor.

## 2017-05-12 NOTE — PROCEDURES
Procedure(s):    Bronchoscopy  Camarena Needle Biopsy (1 sites biopsied, see below for details)    Indication:  Subcarinal mass     Attending of Record:     GORAN Medeiros MD    Trainees Present:   Fiona Tomas MD    Medications:    9 ml 4% lidocaine  9 ml 1% lidocaine  1 mg versed  25 mcg fentanyl    Sedation Time:  30 minutes face-to-face    Time Out:  Performed    The patient's medical record has been reviewed.  The indication for the procedure was reviewed.  The necessary history and physical examination was performed and reviewed.  The risks, benefits and alternatives of the procedure were discussed with the the patient in detail and she had the opportunity to ask questions.  I discussed in particular the potential complications including risks of minor or life-threatening bleeding and/or infection, respiratory failure, vocal cord trauma / paralysis, pneumothorax, and discomfort. Sedation risks were also discussed including abnormal heart rhythms, low blood pressure, and respiratory failure. All questions were answered to the best of my ability.  Verbal and written informed consent was obtained.  The proposed procedure and the patient's identification were verified prior to the procedure by the physician and the nurse, respiratory therapist, resident physician (resident / fellow).    The patient was assessed for the adequacy for the procedure and to receive medications.   Mental Status:  Alert and oriented x 3  Airway examination:  Class II (Complete visualization of uvula)  Pulmonary:  Clear to ausculation bilaterally  CV:  RRR, no murmurs or gallops  ASA Grade:  (II)  Mild systemic disease    After clinical evaluation and reviewing the indication, risks, alternatives and benefits of the procedure the patient was deemed to be in satisfactory condition to undergo the procedure.      Immediately before administration of medications the patient was re-assessed for adequacy to receive sedatives including  the heart rate, respiratory rate, mental status, oxygen saturation, blood pressure and adequacy of pulmonary ventilation. These same parameters were continuously monitored throughout the procedure.    Maneuvers / Procedure:     The bronchoscope was inserted through the right Nare, the cords were anesthetized with lidocaine. Upper airway structures, vocal cords (anatomy and function) appear to be normal.      Airway Examination:  A complete airway examination was performed from the distal trachea to the subsegmental level in each lobe of both lungs with exceptions/pertinent findings noted as follows: There was proximal tracheal narrowing, not compromising her airway. No other endobronchial lesions or secretions noted.                 Camarena Needle Biopsy:  One Site - The biopsy site of subcarina was identified.  Topical epinephrine was not administered.  A Camarena needle was used and with suction and tissue was obtained.  CEISA was present.  A total of 6 samples were obtained. On the first pass, there was pus-like discharge; this was suctioned until the flow stopped. High suspicion for infectious process.     Pertinent Images / special notes:     Proximal tracheal narrowing, not compromising airway.        Widened davonte      Pus-like discharge from first pass      Any disposable equipment was visually inspected and deemed to be intact immediately post procedure.      Recommendations:   1. Please follow-up biopsy results.   2. Consider ID consultation for possible mediastinal infection. Consider isolation for possible TB.   3. If she develops infectious symptoms, would repeat CT chest with IV contrast.     Dr. Medeiros was present during the entire procedure.    Fiona Tomas MD PGY-5  Pulmonary & Critical Care Fellow

## 2017-05-12 NOTE — PLAN OF CARE
"Problem: Goal Outcome Summary  Goal: Goal Outcome Summary  /70  Pulse 69  Temp 96.7  F (35.9  C) (Oral)  Resp 18  Ht 1.702 m (5' 7\")  Wt 79.4 kg (175 lb 1.6 oz)  SpO2 99%  BMI 27.42 kg/m2      VSS on RA. On cardiac monitoring for new onset a-fib. Admitted yesterday evening for feeling SOB and pain on L side of chest which had become worse over the last month. CT and CXR showed mass on L side of chest- Plan for possible IR for biopsy today. R PIV infusing NS at 50mL/hr. Family at bedside- does not speak any english,  request placed for the day team. Has sustained in controlled A-fib since admission. Up to the bathroom x2 but pt not saving, urine sample needs to be collected for UA. Blood sugar checks every 4 hrs in the 170's and 130's. Trigger sepsis upon arrival, lactic acid came back 1.0. Will continue to monitor and follow POC.       "

## 2017-05-12 NOTE — PROGRESS NOTES
INTERNAL MEDICINE PROGRESS NOTE    Jf Schulz ([id number]) admitted on 5/11/2017 05/12/2017    Assessment & Plan:   Jf Schulz is a 67 year old Stateless female who [place] to the [place] a [place] ago with history of DM2, presenting with chest pain, SOB and weight loss, found to have atrial fibrillation and a subcarinal mass.    Changes today:   -- Consulted Pulm for subcarinal biopsy on 5/12 - had pus-like discharge with first pass, concerning for infection (possibly TB)  -- ID consulted for concern for possibly TB lymphadenitis  -- No need for airborne isolation at this time  -- Quantiferon Gold ordered  -- Clear liquid diet, ADAT    # Subcarinal mass  Concerning for infection, possibly TB. Patient with chest pain, SOB, weight loss, night sweats and palpitations. CXR with mediastinal widening and CT with a large subcarinal mass.  Initially concerned for malignancy but bronchoscopic biopsy on 5/12 showed pus-like discharge with first pass, concerning for infection and less likely malignancy.   -- Consulted Pulm for subcarinal biopsy on 5/12 - had pus-like discharge with first pass, concerning for infection (possibly TB)  -- ID consulted for concern for possibly TB lymphadenitis  -- No need for airborne isolation at this time  -- Quantiferon Gold ordered  -- If febrile overnight, get stat CT chest with IV contrast, concern that discharge after subcarinal biopsy may have spread infection (possible TB) into lungs     # Atrial fibrillation  Patient presenting with palpitations and SOB, notes that she has been having palpitations for 1 month. EKG showing A fib with normal rate. Vital signs stable and normal. Unsure if related to above mass. Will require anticoagulation as CHADS2-VASc score of 3.   -- Metoprolol 12.5 mg PO BID  -- Will discuss possible anticoagulation in AM  -- On telemetry    # DM2  Patient on Metformin 1000 mg AM and and Insulin Lantus 10 units at home. A1C 8 on 5/12.  -- Low SSI  -- Will hold PTA  "lantus  -- Hypoglycemia protocol  -- Ct simvastatin      FEN: Clear liquid diet, ADAT  Prophylaxis: DVT: Pending biopsy GI: None  Disposition: SCDs, ambulate TID, possibly discharge home in next 1-2 days  Code Status: FULL CODE      Patient was seen and discussed with Dr. Celeste who agrees with above assessment and plan.      Clementine Regalado MD, PhD  PGY-1 Internal Medicine  [phone]      ==================================================================    Interval Events:  No acute events overnight. No chest pain, SOB, abd pain, N/V.        Objective:  Most recent vital signs:  /71  Pulse 76  Temp 96.5  F (35.8  C) (Oral)  Resp 13  Ht 1.702 m (5' 7\")  Wt 79.4 kg (175 lb 1.6 oz)  SpO2 99%  BMI 27.42 kg/m2  Temp:  [94  F (34.4  C)-96.7  F (35.9  C)] 96.5  F (35.8  C)  Pulse:  [69-76] 76  Heart Rate:  [] 72  Resp:  [13-22] 13  BP: (114-127)/(56-80) 121/71  SpO2:  [94 %-99 %] 99 %  Wt Readings from Last 2 Encounters:   05/11/17 79.4 kg (175 lb 1.6 oz)       Physical exam:  General: Patient sitting comfortably in chair, NAD   HEENT: NC/AT, EOMI, PERRL, no scleral icterus or injection, MMM  CV: RRR, normal S1 and S2, no murmurs, 2+ bilateral radial pulses  Respiratory: CTAB, no w/r/r, on RA  GI: soft, NT/ND, NABS, no guarding or rebound  Extremities: No LE edema  Skin: No acute lesions appreciated  Neuro: AOx3, CN II-XII grossly intact, no focal neurological deficits    Labs: Reviewed.   -171      Attestation:  This patient has been seen and evaluated by me, Gorge Celeste.  Discussed with the house staff team or resident(s) and agree with the findings and plan in this note.     I have reviewed today's Medications, Vital Signs, Labs and Imaging.  Seen with bedside nurse.  Discussed with care coordinator and .    Recent immigrant found to have a sub-carinal mass with new atrial fibrillation.  Biopsied via bronchoscopy today.  Pulm reports pus drainage suggesting infectious etiology.  " Concern for TB lymphadenitis.  No evidence of pulmonary TB.

## 2017-05-13 VITALS
HEART RATE: 76 BPM | DIASTOLIC BLOOD PRESSURE: 71 MMHG | OXYGEN SATURATION: 97 % | SYSTOLIC BLOOD PRESSURE: 128 MMHG | BODY MASS INDEX: 27.48 KG/M2 | TEMPERATURE: 97 F | RESPIRATION RATE: 16 BRPM | WEIGHT: 175.1 LBS | HEIGHT: 67 IN

## 2017-05-13 LAB
ANION GAP SERPL CALCULATED.3IONS-SCNC: 9 MMOL/L (ref 3–14)
BUN SERPL-MCNC: 13 MG/DL (ref 7–30)
CALCIUM SERPL-MCNC: 8.2 MG/DL (ref 8.5–10.1)
CHLORIDE SERPL-SCNC: 108 MMOL/L (ref 94–109)
CO2 SERPL-SCNC: 23 MMOL/L (ref 20–32)
CREAT SERPL-MCNC: 0.59 MG/DL (ref 0.52–1.04)
ERYTHROCYTE [DISTWIDTH] IN BLOOD BY AUTOMATED COUNT: 13.1 % (ref 10–15)
GFR SERPL CREATININE-BSD FRML MDRD: ABNORMAL ML/MIN/1.7M2
GLUCOSE BLDC GLUCOMTR-MCNC: 150 MG/DL (ref 70–99)
GLUCOSE BLDC GLUCOMTR-MCNC: 153 MG/DL (ref 70–99)
GLUCOSE BLDC GLUCOMTR-MCNC: 255 MG/DL (ref 70–99)
GLUCOSE SERPL-MCNC: 233 MG/DL (ref 70–99)
HCT VFR BLD AUTO: 36.7 % (ref 35–47)
HGB BLD-MCNC: 11.9 G/DL (ref 11.7–15.7)
MCH RBC QN AUTO: 30 PG (ref 26.5–33)
MCHC RBC AUTO-ENTMCNC: 32.4 G/DL (ref 31.5–36.5)
MCV RBC AUTO: 92 FL (ref 78–100)
PLATELET # BLD AUTO: 187 10E9/L (ref 150–450)
POTASSIUM SERPL-SCNC: 3.5 MMOL/L (ref 3.4–5.3)
RBC # BLD AUTO: 3.97 10E12/L (ref 3.8–5.2)
SODIUM SERPL-SCNC: 140 MMOL/L (ref 133–144)
WBC # BLD AUTO: 4 10E9/L (ref 4–11)

## 2017-05-13 PROCEDURE — 80048 BASIC METABOLIC PNL TOTAL CA: CPT | Performed by: INTERNAL MEDICINE

## 2017-05-13 PROCEDURE — 85027 COMPLETE CBC AUTOMATED: CPT | Performed by: INTERNAL MEDICINE

## 2017-05-13 PROCEDURE — 36415 COLL VENOUS BLD VENIPUNCTURE: CPT | Performed by: INTERNAL MEDICINE

## 2017-05-13 PROCEDURE — 00000146 ZZHCL STATISTIC GLUCOSE BY METER IP

## 2017-05-13 PROCEDURE — 99239 HOSP IP/OBS DSCHRG MGMT >30: CPT | Mod: GC | Performed by: PEDIATRICS

## 2017-05-13 PROCEDURE — 25000132 ZZH RX MED GY IP 250 OP 250 PS 637: Performed by: INTERNAL MEDICINE

## 2017-05-13 RX ORDER — SIMVASTATIN 10 MG
10 TABLET ORAL AT BEDTIME
Status: DISCONTINUED | OUTPATIENT
Start: 2017-05-13 | End: 2017-05-13 | Stop reason: HOSPADM

## 2017-05-13 RX ORDER — METOPROLOL TARTRATE 25 MG/1
12.5 TABLET, FILM COATED ORAL 2 TIMES DAILY
Qty: 30 TABLET | Refills: 0 | Status: ON HOLD | OUTPATIENT
Start: 2017-05-13 | End: 2024-07-02

## 2017-05-13 RX ORDER — METOPROLOL TARTRATE 25 MG/1
12.5 TABLET, FILM COATED ORAL 2 TIMES DAILY
Qty: 3 TABLET | Refills: 3 | Status: SHIPPED | OUTPATIENT
Start: 2017-05-13 | End: 2017-06-01

## 2017-05-13 RX ADMIN — METOPROLOL TARTRATE 12.5 MG: 25 TABLET, FILM COATED ORAL at 09:04

## 2017-05-13 NOTE — PLAN OF CARE
Problem: Goal Outcome Summary  Goal: Goal Outcome Summary  OH, says feels well when interpretor present and team visited in am. Tolerating her diet and activity, no c/o pain.  All agree A-Fib can be managed medically, agree on d/c home today. Son from [place] here, speaks English will interpret at discharge time. Team called will have discharge ready soon.

## 2017-05-13 NOTE — PLAN OF CARE
Problem: Goal Outcome Summary  Goal: Goal Outcome Summary  Outcome: No Change  Patient quiet, comfortable tonight.  Walking in room for exercise.  Daughter present this evening and will stay over night.  Tolerated clear liquid diet and then was advanced to a regular diet and tolerated that well.  Makes needs known as able, Citizen of the [place] is primary language.

## 2017-05-13 NOTE — PLAN OF CARE
Problem: Goal Outcome Summary  Goal: Goal Outcome Summary  Outcome: No Change  Pt A&O. VSS on RA. Up independently, calls appropriately. Primarily [place]an speaking. Family member at bedside, assisting with cares. BG stable at 153. Sleeping comfortably. Telemetry in place, reading NSR-intermittently cindy to mid's 50's. NS at 50 ml/hr to PIV. Denies pain/nausea. Bronch completed 5/12.

## 2017-05-13 NOTE — PROGRESS NOTES
Pt discharging to home this afternoon via family. AVS reviewed and signed at bedside with family, questions answered. Directed where to  medications. PIV removed. Belongings gathered per family. Adequate for discharge.

## 2017-05-13 NOTE — CONSULTS
[facility] ID Service: Initial Consultation     Patient:  Jf Schulz, Date of birth 1950, Medical record number [id number]  Date of Visit:  May 13, 2017  Consult Requested by: Gorge Celeste MD         Assessment and Recommendations:   Problem List:  Subcarinal mass   Afib  DM2    Recommendations:  1) We will continue to follow cultures/stains from yesterday's procedure  2) If patient wishes to discharge, we are okay with that. Patient resides in [place], so we can arrange follow-up as an outpatient if she does grow AFB from the mass.  3) If not ordered, recommend cytology/malinancy work-up from mass as well (lab may still have some fluid that can be sent).    Discussion:  Certainly, Mycobacterium tuberculosis disease is a possibility in the case of a [place] immigrant with mediastinal/subcarinal lymphadneopathy or mass, and we greatly appreciate pulmonology collecting a sample of the fluid in the mass. Presently, she should not be infectious (apart from maybe coughing up a bit of the material now in her trachea/upper airway, though this risk would likely be minimal if any). As she is clinically improved post biopsy and relevant studies, we can wait for results to return prior to initiating therapy. Given the proximity of the LAD to her aorta/atrium and it's possible connection to the A fib, may entertain concomitant steroid therapy along with ant-tuberculosis therapy if her biopsy proves consistent with this diagnosis. She feels well, chest pain is resolved, and is otherwise stable, so we don't see any barriers to discharge. Moving forward, if this does end up being mycobacterial, we can have her follow-up outpatient with either ID or the [facility], which we will take care of arranging on Monday. Discussed with patient and she agrees.     Thank you for consulting us in the care of your patient. Feel free to call with any questions.    Reese De La Torre, ID Fellow,  "[phone]  Attestation:  This patient has been seen and evaluated by me, Raul Sutherland MD.  I discussed this patient with the fellow and/or resident(s) and agree with the findings and plan in this note. I also personally edited this note to reflect my findings. I have reviewed today's vital signs, medications, labs and imaging.   JUANY Sutherland M.D.  [facility] Service Staff  [phone]         History of Present Illness:      66yo [place] woman with history of DM2 who presents with 1 month of progressive chest pain described as a \"pulling\" sensation behind her left breast that was worsened by lying on her left side, but unchanged with activity. She then developed dyspnea with daily activities, but denied cough, fever, nausea/vomiting. She has had an unquantified amount of weight loss over the past several months, but says \"it isn't much\". She has had intermittent fever and sweating for the past month that she thought was due to her diabetes. She has no history of tuberculosis, no known TB contacts, and can properly describe blood and imaging testing on immigration, stating \"it was all okay\".She immigrated from [facility] (had moved there from [facility]) on March 22, 2016, and has family here in [place] (where she now lives).          Review of Systems:   Full 10 point ROS obtained, pertinent positives and negatives as above.       Past Medical History:   Diabetes mellitus, type 2      Allergies:    No Known Allergies         Current Antimicrobials:     None       Family History:   History reviewed. No pertinent family history.         Social History:     Social History     Social History     Marital status:      Spouse name: N/A     Number of children: N/A     Years of education: N/A     Occupational History     Not on file.     Social History Main Topics     Smoking status: Never Smoker     Smokeless tobacco: Not on file     Alcohol use No     Drug use: No     Sexual activity: Not on " file     Other Topics Concern     Not on file     Social History Narrative              Physical Exam:   Ranges forvital signs:  Temp:  [95.5  F (35.3  C)-96.6  F (35.9  C)] 95.5  F (35.3  C)  Heart Rate:  [72-94] 77  Resp:  [8-23] 16  BP: ()/(60-75) 128/71  SpO2:  [92 %-100 %] 97 %    Intake/Output Summary (Last 24 hours) at 05/13/17 1318  Last data filed at 05/13/17 1000   Gross per 24 hour   Intake          1910.83 ml   Output              400 ml   Net          1510.83 ml       Exam:  GENERAL:  well-developed, well-nourished, sitting in bed in no acute distress.   ENT:  Head is normocephalic, atraumatic. Oropharynx is moist without exudates or ulcers.  EYES:  Eyes have anicteric sclerae.    NECK:  Supple.  LUNGS:  Clear to auscultation.  CARDIOVASCULAR:  Regular rate and rhythm with no murmurs, gallops or rubs.  ABDOMEN:  Normal bowel sounds, soft, nontender.  EXT: Extremities warm and without edema.  SKIN:  No acute rashes.  Line is in place without any surrounding erythema.  NEUROLOGIC:  Grossly nonfocal.         Laboratory Data:     Hematology Studies  Recent Labs   Lab Test  05/13/17   0733  05/11/17   1836   WBC  4.0  5.5   ANEU   --   2.0   AEOS   --   0.3   HGB  11.9  14.3   MCV  92  91   PLT  187  205     Metabolic Studies   Recent Labs   Lab Test  05/13/17   0733  05/11/17   1836   NA  140  142   POTASSIUM  3.5  4.1   CHLORIDE  108  109   CO2  23  27   BUN  13  13   CR  0.59  0.60   GFRESTIMATED  >90  Non  GFR Calc    >90  Non  GFR Calc         Hepatic Studies  Recent Labs   Lab Test  05/11/17   1836   BILITOTAL  0.3   ALKPHOS  68   ALBUMIN  3.4   AST  11   ALT  12       Thyroid Studies    Recent Labs   Lab Test  05/11/17   1836   TSH  0.99       Microbiology:  Culture Micro   Date Value Ref Range Status   05/12/2017 Pending  Incomplete   05/12/2017   Final    Canceled, Test credited  Test reordered as correct code     05/12/2017 Culture negative monitoring continues   Final       Urine Studies    Recent Labs   Lab Test  05/12/17   1325   LEUKEST  Negative   WBCU  2          Imaging:   CT Chest 5/11/17  1. No evidence for aortic dissection or other acute vascular  abnormality of the chest.  2. Large subcarinal soft tissue mass which is suspicious for neoplasm.  3. Probable 2 cm benign cyst upper pole left kidney.

## 2017-05-13 NOTE — PROGRESS NOTES
Focus: Post procedure.  D: Arrived back to 5a at 1640. Patient requesting food.  I: Assisted to bed with SBA, walking.  VS obtained.    A: VSS.  99% on room air.   present.  Son present.  Patient verbalizing through son and .  Appears comfortable, denies pain.  MD notified of patients arrival.    P: No special precautions necessary per resident who also discussed with Infectious disease.

## 2017-05-14 NOTE — DISCHARGE SUMMARY
[facility] [place]  Discharge Summary- [facility] 1 Medicine Team    Date of Admission:  5/11/2017  Date of Discharge:  5/13/2017  5:36 PM  Discharging Provider: Maribeth Jenkins    Discharge Diagnoses   Subcarinal Mass   New onset atrial fibrillation    History of Present Illness   Jf Schulz is an 68 yo F with hx of DM 2 on insulin who presents with chest pain, dyspnea and weight loss, found to have new atrial fibrillation and subcarinal mass. See H & P for details.     Hospital Course   Jf Schulz was admitted on 5/11/2017.  The following problems were addressed during her hospitalization:    #Subcarinal Mass    [place] recently [place] from [place]. Location of mass concerning for infection, potentially TB. Other potential etiologies include malignancy. Mass was biopsied via US with bronchoscopy. Consistency of biopsy was more liquid appearing, increasing potential concern for infection moreso than mass. Quant TB sent as well as cultures and cytology studies from biopsy sample. Initial gram stain demonstrated wbc with pmns but no organisms. See pending labs below. Will be followed up by ID (who saw pt and consulted during hospitalization) as well as PCP. Patient not placed on antimicrobials as asymptomatic, afebrile. Aware that due to liquid consistency of biopsy, she potentially could have spread of infection (if this is what mass is) and that if fevers, chills, chest pain, dyspnea - should return to ED or PCP. If readmitted appearing septic, consider Chest CT with IV contrast, as concern that after subcarinal biopsy may have spread infection into lungs.     #Atrial Fibrillation   No Afib with RVR, but was started on metoprolol BID (see below). Well tolerated with no symptomatic bradycardia, dizziness. Continued on d/c. Discussed risk of CVA with afib and need to discuss anticoagulation after further info known regarding subcarinal mass. Anticoagulation should be discussed  with PCP. IVETH 3.     Plan discussed with her son, message left per patient request with her niece, Farzad [phone] (English Speaking, medical background). Greek  present for all conversations.    Maribeth Jenkins, PGY3 [phone] pager   Seen and staffed with Dr. Celeste.     Significant Results and Procedures   5/11/17 Chest CT with IV CONTRAST   IMPRESSION:  1. No evidence for aortic dissection or other acute vascular  abnormality of the chest.  2. Large subcarinal soft tissue mass which is suspicious for neoplasm.  3. Probable 2 cm benign cyst upper pole left kidney.     5/12/17   Bronchoscopy  Camarena Needle Biopsy (1 sites biopsied, see below for details)    Pending Results   These results will be followed up by PCP and ID team.   Unresulted Labs Ordered in the Past 30 Days of this Admission     Date and Time Order Name Status Description    5/12/2017 1528 Nocardia culture In process     5/12/2017 1528 Fungus Culture, non-blood In process     5/12/2017 1528 AFB Stain Non Blood Preliminary     5/12/2017 1528 AFB Culture Non Blood Preliminary     5/12/2017 1509 Fluid Culture Aerobic Bacterial Preliminary     5/12/2017 0920 M Tuberculosis by Quantiferon ! Follow QTB collection process In process           Primary Care Physician   Samuel Dubose  Home clinic: [facility]     Physical Exam   Vital Signs with Ranges  Temp:  [95.5  F (35.3  C)-97  F (36.1  C)] 97  F (36.1  C)  Heart Rate:  [73-77] 77  Resp:  [16-18] 16  BP: (109-128)/(60-71) 128/71  SpO2:  [97 %-100 %] 97 %  I/O last 3 completed shifts:  In: 1910.83 [P.O.:840; I.V.:1070.83]  Out: 400 [Urine:400]    Gen - NAD. Conversant, family at bedside.    Respir - CTAB. No increased work of breathing   Mouth - moist   Eyes - EOMI   Abdomen - soft, nontender   Cardiac - RRR. No MRG   Extremities - no swelling    Time Spent on this Encounter   I, Maribeth Jenkins, personally saw the patient today and spent greater than 30 minutes discharging  this patient.    Discharge Disposition   Discharged to home  Condition at discharge: Stable    Consultations This Hospital Stay   INTERVENTIONAL RADIOLOGY IP CONSULT  MEDICATION HISTORY IP PHARMACY CONSULT  PULMONARY GENERAL ADULT IP CONSULT  INFECTIOUS DISEASE GENERAL ADULT IP CONSULT    Discharge Orders     Reason for your hospital stay   You were hospitalized for a mass that was discovered in your mediastinum (middle of your chest, not in lungs). You were also noted to have an irregular heart rhythm called atrial fibrillation. A sample biopsy was taken of the mass (further studies from this are pending). You were started on a new medication to control your heart rate.     Follow Up and recommended labs and tests   1. Follow up on Tuesday with your primary care provider Dr. Samuel Dubose at [facility] He should get [employer] to get your laboratory results and help set up follow-up as needed.     2. The infectious disease doctors will follow the lab sample results at the [place] and will contact you as well if something returns concerning for infection. You will be scheduled up for follow up with infectious disease as needed.     3. You should discuss new diagnosis of atrial fibrillation with your primary doctor. We discussed that there is a risk of stroke due to blood clots that can form in the heart with this abnormal heart rhythm. Given that we do not know definitely what the mass is in your chest, we would like to have a plan for this before considering starting blood thinners.      You were started on a medication to slow down your heart as sometimes your irregular heart rhythm can speed up and make you feel unwell.     Activity   Your activity upon discharge as tolerated     When to contact your care team   If you are feeling unwell or have new or concerning symptoms, contact your primary care doctor. If you have shortness of breath, chest pain, fevers and chills, come to emergency  department     Discharge Instructions   1. Follow up appointments as above   2. Continue all previous home medications   3. New medication for heart rhythm is in discharge medications (metoprolol and you can  at [place] [facility]).     Full Code     Diet   Follow this diet upon discharge: Regular diabetic diet       Discharge Medications   Discharge Medication List as of 5/13/2017  4:40 PM      START taking these medications    Details   metoprolol (LOPRESSOR) 25 MG tablet Take 0.5 tablets (12.5 mg) by mouth 2 times daily If well tolerated, should have further medication prescribed from primary doctor, Disp-30 tablet, R-0, E-Prescribe         CONTINUE these medications which have NOT CHANGED    Details   vitamin D (ERGOCALCIFEROL) 82091 UNIT capsule Take 50,000 Units by mouth Every Mon, Wed, Fri Morning, Historical      METFORMIN HCL PO Take 1,000 mg by mouth daily (with breakfast) , Historical      insulin glargine (LANTUS) 100 UNIT/ML injection Inject 10 Units Subcutaneous At Bedtime , Historical      SIMVASTATIN PO Take 10 mg by mouth At Bedtime, Historical           Allergies   No Known Allergies  Data   Most Recent 3 CBC's:  Recent Labs   Lab Test  05/13/17   0733  05/11/17   1836   WBC  4.0  5.5   HGB  11.9  14.3   MCV  92  91   PLT  187  205      Most Recent 3 BMP's:  Recent Labs   Lab Test  05/13/17   0733  05/11/17   1836   NA  140  142   POTASSIUM  3.5  4.1   CHLORIDE  108  109   CO2  23  27   BUN  13  13   CR  0.59  0.60   ANIONGAP  9  6   NAYELI  8.2*  9.1   GLC  233*  148*     Most Recent 2 LFT's:  Recent Labs   Lab Test  05/11/17   1836   AST  11   ALT  12   ALKPHOS  68   BILITOTAL  0.3     Most Recent INR's and Anticoagulation Dosing History:  Anticoagulation Dose History     Recent Dosing and Labs Latest Ref Rng & Units 5/11/2017    INR 0.86 - 1.14 0.95        Most Recent 3 Troponin's:  Recent Labs   Lab Test  05/11/17   1836   TROPI  <0.015  The 99th percentile for upper reference range is  0.045 ug/L.  Troponin values in   the range of 0.045 - 0.120 ug/L may be associated with risks of adverse   clinical events.       Most Recent Cholesterol Panel:No lab results found.  Most Recent 6 Bacteria Isolates From Any Culture (See EPIC Reports for Culture Details):  Recent Labs   Lab Test  05/12/17   1509   CULT  Culture negative monitoring continues  Pending  Canceled, Test credited  Test reordered as correct code       Most Recent TSH, T4 and A1c Labs:  Recent Labs   Lab Test  05/12/17   0043  05/11/17   1836   TSH   --   0.99   A1C  8.0*   --        Attestation:  This patient has been seen and evaluated by me, Gorge Celeste.  I have reviewed today's Medications, Vital Signs and Labs.  Discussed with the house staff team or resident(s) and agree with the findings and plan in this note.    Time spent on patient: 45 minutes total including face to face and coordinating care time reviewing current illness, any medication changes, and the care plan for today.  Due to language barrier, an  was present during the history-taking and subsequent discussion (and for part of the physical exam) with this patient.

## 2017-05-14 NOTE — H&P
[facility], [place]  Discharge Summary- [facility] 1 Medicine Team    Date of Admission:  5/11/2017  Date of Discharge:  5/13/2017  5:36 PM  Discharging Provider: Maribeth Jenkins    Discharge Diagnoses   Subcarinal Mass   New onset atrial fibrillation    History of Present Illness   Jf Schulz is an 68 yo F with hx of DM 2 on insulin who presents with chest pain, dyspnea and weight loss, found to have new atrial fibrillation and subcarinal mass. See H & P for details.     Hospital Course   Jf Schulz was admitted on 5/11/2017.  The following problems were addressed during her hospitalization:    #Subcarinal Mass    [place] from [place]. Location of mass concerning for infection, potentially TB. Other potential etiologies include malignancy. Mass was biopsied via US with bronchoscopy. Consistency of biopsy was more liquid appearing, increasing potential concern for infection moreso than mass. Quant TB sent as well as cultures and cytology studies from biopsy sample. Initial gram stain demonstrated wbc with pmns but no organisms. See pending labs below. Will be followed up by ID (who saw pt and consulted during hospitalization) as well as PCP. Patient not placed on antimicrobials as asymptomatic, afebrile. Aware that due to liquid consistency of biopsy, she potentially could have spread of infection (if this is what mass is) and that if fevers, chills, chest pain, dyspnea - should return to ED or PCP. If readmitted appearing septic, consider Chest CT with IV contrast, as concern that after subcarinal biopsy may have spread infection into lungs.     #Atrial Fibrillation   No Afib with RVR, but was started on metoprolol BID (see below). Well tolerated with no symptomatic bradycardia, dizziness. Continued on d/c. Discussed risk of CVA with afib and need to discuss anticoagulation after further info known regarding subcarinal mass. Anticoagulation should be discussed  with PCP. IVETH 3.     Plan discussed with her son, message left per patient request with her niece, Farzad [phone] (English Speaking, medical background). Bahamian  present for all conversations.    Maribeth Jenkins, PGY3 [phone] pager   Seen and staffed with Dr. Celeste.     Significant Results and Procedures   5/11/17 Chest CT with IV CONTRAST   IMPRESSION:  1. No evidence for aortic dissection or other acute vascular  abnormality of the chest.  2. Large subcarinal soft tissue mass which is suspicious for neoplasm.  3. Probable 2 cm benign cyst upper pole left kidney.     5/12/17   Bronchoscopy  Camarena Needle Biopsy (1 sites biopsied, see below for details)    Pending Results   These results will be followed up by PCP and ID team.   Unresulted Labs Ordered in the Past 30 Days of this Admission     Date and Time Order Name Status Description    5/12/2017 1528 Nocardia culture In process     5/12/2017 1528 Fungus Culture, non-blood In process     5/12/2017 1528 AFB Stain Non Blood Preliminary     5/12/2017 1528 AFB Culture Non Blood Preliminary     5/12/2017 1509 Fluid Culture Aerobic Bacterial Preliminary     5/12/2017 0920 M Tuberculosis by Quantiferon ! Follow QTB collection process In process           Primary Care Physician   Samuel Dubose  Home clinic: [facility]     Physical Exam   Vital Signs with Ranges  Temp:  [95.5  F (35.3  C)-97  F (36.1  C)] 97  F (36.1  C)  Heart Rate:  [73-77] 77  Resp:  [16-18] 16  BP: (109-128)/(60-71) 128/71  SpO2:  [97 %-100 %] 97 %  I/O last 3 completed shifts:  In: 1910.83 [P.O.:840; I.V.:1070.83]  Out: 400 [Urine:400]    Gen - NAD. Conversant, family at bedside.    Respir - CTAB. No increased work of breathing   Mouth - moist   Eyes - EOMI   Abdomen - soft, nontender   Cardiac - RRR. No MRG   Extremities - no swelling    Time Spent on this Encounter   I, Maribeth Jenkins, personally saw the patient today and spent greater than 30 minutes discharging  this patient.    Discharge Disposition   Discharged to home  Condition at discharge: Stable    Consultations This Hospital Stay   INTERVENTIONAL RADIOLOGY IP CONSULT  MEDICATION HISTORY IP PHARMACY CONSULT  PULMONARY GENERAL ADULT IP CONSULT  INFECTIOUS DISEASE GENERAL ADULT IP CONSULT    Discharge Orders     Reason for your hospital stay   You were hospitalized for a mass that was discovered in your mediastinum (middle of your chest, not in lungs). You were also noted to have an irregular heart rhythm called atrial fibrillation. A sample biopsy was taken of the mass (further studies from this are pending). You were started on a new medication to control your heart rate.     Follow Up and recommended labs and tests   1. Follow up on Tuesday with your primary care provider Dr. Samuel Dubose at [facility]. He should get [employer] to get your laboratory results and help set up follow-up as needed.     2. The infectious disease doctors will follow the lab sample results at the [facility] and will contact you as well if something returns concerning for infection. You will be scheduled up for follow up with infectious disease as needed.     3. You should discuss new diagnosis of atrial fibrillation with your primary doctor. We discussed that there is a risk of stroke due to blood clots that can form in the heart with this abnormal heart rhythm. Given that we do not know definitely what the mass is in your chest, we would like to have a plan for this before considering starting blood thinners.      You were started on a medication to slow down your heart as sometimes your irregular heart rhythm can speed up and make you feel unwell.     Activity   Your activity upon discharge as tolerated     When to contact your care team   If you are feeling unwell or have new or concerning symptoms, contact your primary care doctor. If you have shortness of breath, chest pain, fevers and chills, come to emergency  department     Discharge Instructions   1. Follow up appointments as above   2. Continue all previous home medications   3. New medication for heart rhythm is in discharge medications (metoprolol and you can  at [place] pharmacy).     Full Code     Diet   Follow this diet upon discharge: Regular diabetic diet       Discharge Medications   Discharge Medication List as of 5/13/2017  4:40 PM      START taking these medications    Details   metoprolol (LOPRESSOR) 25 MG tablet Take 0.5 tablets (12.5 mg) by mouth 2 times daily If well tolerated, should have further medication prescribed from primary doctor, Disp-30 tablet, R-0, E-Prescribe         CONTINUE these medications which have NOT CHANGED    Details   vitamin D (ERGOCALCIFEROL) 73183 UNIT capsule Take 50,000 Units by mouth Every Mon, Wed, Fri Morning, Historical      METFORMIN HCL PO Take 1,000 mg by mouth daily (with breakfast) , Historical      insulin glargine (LANTUS) 100 UNIT/ML injection Inject 10 Units Subcutaneous At Bedtime , Historical      SIMVASTATIN PO Take 10 mg by mouth At Bedtime, Historical           Allergies   No Known Allergies  Data   Most Recent 3 CBC's:  Recent Labs   Lab Test  05/13/17   0733  05/11/17   1836   WBC  4.0  5.5   HGB  11.9  14.3   MCV  92  91   PLT  187  205      Most Recent 3 BMP's:  Recent Labs   Lab Test  05/13/17   0733  05/11/17   1836   NA  140  142   POTASSIUM  3.5  4.1   CHLORIDE  108  109   CO2  23  27   BUN  13  13   CR  0.59  0.60   ANIONGAP  9  6   NAYELI  8.2*  9.1   GLC  233*  148*     Most Recent 2 LFT's:  Recent Labs   Lab Test  05/11/17   1836   AST  11   ALT  12   ALKPHOS  68   BILITOTAL  0.3     Most Recent INR's and Anticoagulation Dosing History:  Anticoagulation Dose History     Recent Dosing and Labs Latest Ref Rng & Units 5/11/2017    INR 0.86 - 1.14 0.95        Most Recent 3 Troponin's:  Recent Labs   Lab Test  05/11/17   1836   TROPI  <0.015  The 99th percentile for upper reference range is  0.045 ug/L.  Troponin values in   the range of 0.045 - 0.120 ug/L may be associated with risks of adverse   clinical events.       Most Recent Cholesterol Panel:No lab results found.  Most Recent 6 Bacteria Isolates From Any Culture (See EPIC Reports for Culture Details):  Recent Labs   Lab Test  05/12/17   1509   CULT  Culture negative monitoring continues  Pending  Canceled, Test credited  Test reordered as correct code       Most Recent TSH, T4 and A1c Labs:  Recent Labs   Lab Test  05/12/17   0043  05/11/17   1836   TSH   --   0.99   A1C  8.0*   --

## 2017-05-15 LAB
ACID FAST STN SPEC QL: NORMAL
M TB TUBERC IFN-G BLD QL: NEGATIVE
M TB TUBERC IFN-G/MITOGEN IGNF BLD: 0.03 IU/ML
MICRO REPORT STATUS: NORMAL
SPECIMEN SOURCE: NORMAL

## 2017-05-16 ENCOUNTER — CARE COORDINATION (OUTPATIENT)
Dept: CARE COORDINATION | Facility: CLINIC | Age: 67
End: 2017-05-16

## 2017-05-16 LAB — COPATH REPORT: NORMAL

## 2017-05-16 NOTE — PROGRESS NOTES
Patient has clinic visit today 5/16 so no post DC follow up call is needed          Follow-up Appointments           Follow Up and recommended labs and tests       1. Follow up on Tuesday with your primary care provider Dr. Samuel Dubose. He should get ahold of [facility] to get your laboratory results and help set up follow-up as needed.

## 2017-05-17 LAB
BACTERIA SPEC CULT: NO GROWTH
MICRO REPORT STATUS: NORMAL
SPECIMEN SOURCE: NORMAL

## 2017-05-19 ENCOUNTER — TELEPHONE (OUTPATIENT)
Dept: SURGERY | Facility: CLINIC | Age: 67
End: 2017-05-19

## 2017-05-19 DIAGNOSIS — J98.59 MEDIASTINAL MASS: Primary | ICD-10-CM

## 2017-05-19 NOTE — TELEPHONE ENCOUNTER
Call to patient-no answer and voicemail not set up. Call to Tisha, her niece, who is listed as a contact. I explained to her that Dr. Medeiros recommends Jf see a thoracic surgeon to discuss removing the mass as it is beginning to compress local anatomy and this will likely get worse as the mass increases. Tisha will talk with Jf about this and will call me if she has further questions.

## 2017-05-22 NOTE — TELEPHONE ENCOUNTER
1.  Date/reason for appt: 17 - Mediastinal mass  2.  Referring provider: Dr. Samuel Dubose   3.  Call to patient (Yes / No - short description): No - scheduled with Minda from clinic - she will contact pt  4.  Previous care at / records requested from: [facility] - records in Epic  5.  Recent Imagin17 CT chest, 17 CXR - in Epic

## 2017-06-01 ENCOUNTER — PRE VISIT (OUTPATIENT)
Dept: SURGERY | Facility: CLINIC | Age: 67
End: 2017-06-01

## 2017-06-01 ENCOUNTER — OFFICE VISIT (OUTPATIENT)
Dept: SURGERY | Facility: CLINIC | Age: 67
End: 2017-06-01
Attending: STUDENT IN AN ORGANIZED HEALTH CARE EDUCATION/TRAINING PROGRAM
Payer: COMMERCIAL

## 2017-06-01 VITALS
TEMPERATURE: 98.5 F | DIASTOLIC BLOOD PRESSURE: 82 MMHG | OXYGEN SATURATION: 97 % | HEART RATE: 89 BPM | RESPIRATION RATE: 16 BRPM | WEIGHT: 172.4 LBS | BODY MASS INDEX: 27.06 KG/M2 | HEIGHT: 67 IN | SYSTOLIC BLOOD PRESSURE: 127 MMHG

## 2017-06-01 DIAGNOSIS — J98.59 MEDIASTINAL MASS: ICD-10-CM

## 2017-06-01 DIAGNOSIS — J98.59 MEDIASTINAL MASS: Primary | ICD-10-CM

## 2017-06-01 PROCEDURE — 99212 OFFICE O/P EST SF 10 MIN: CPT | Mod: ZF

## 2017-06-01 PROCEDURE — T1013 SIGN LANG/ORAL INTERPRETER: HCPCS | Mod: U3,ZF

## 2017-06-01 ASSESSMENT — PAIN SCALES - GENERAL: PAINLEVEL: NO PAIN (0)

## 2017-06-01 NOTE — NURSING NOTE
"Oncology Rooming Note    June 1, 2017 2:48 PM   Jf Schulz is a 67 year old female who presents for:    Chief Complaint   Patient presents with     Oncology Clinic Visit     Mediastinal mass     Initial Vitals: /82  Pulse 89  Temp 98.5  F (36.9  C) (Oral)  Resp 16  Ht 1.702 m (5' 7.01\")  Wt 78.2 kg (172 lb 6.4 oz)  SpO2 97%  BMI 27 kg/m2 Estimated body mass index is 27 kg/(m^2) as calculated from the following:    Height as of this encounter: 1.702 m (5' 7.01\").    Weight as of this encounter: 78.2 kg (172 lb 6.4 oz). Body surface area is 1.92 meters squared.  No Pain (0) Comment: Data Unavailable   No LMP recorded. Patient is postmenopausal.  Allergies reviewed: Yes  Medications reviewed: Yes    Medications: Medication refills not needed today.  Pharmacy name entered into [employer]: [facility], [facility] - [place], [place] - [address] [place] [address]    Clinical concerns: no new concerns     6 minutes for nursing intake (face to face time)     Gloria Adhikari CMA                "

## 2017-06-01 NOTE — MR AVS SNAPSHOT
After Visit Summary   6/1/2017    Jf Schulz    MRN: [id number]           Patient Information     Date Of Birth          1950        Visit Information        Provider Department      6/1/2017 2:45 PM Elmer Faust; Renetta Vinson MD [facility]        Today's Diagnoses     Mediastinal mass    -  1       Follow-ups after your visit        Your next 10 appointments already scheduled     Jun 04, 2017 10:00 AM CDT   (Arrive by 9:45 AM)   MR CHEST W/O & W CONTRAST with [facility])    [address] [place] [id number] [phone]           Take your medicines as usual, unless your doctor tells you not to. Bring a list of your current medicines to your exam (including vitamins, minerals and over-the-counter drugs).  You will be given intravenous contrast for this exam. To prepare:   The day before your exam, drink extra fluids at least six 8-ounce glasses (unless your doctor tells you to restrict your fluids).   Have a blood test (creatinine test) within 30 days of your exam. Go to your clinic or Diagnostic Imaging Department for this test.  The MRI machine uses a strong magnet. Please wear clothes without metal (snaps, zippers). A sweatsuit works well, or we may give you a hospital gown.  Please remove any body piercings and hair extensions before you arrive. You will also remove watches, jewelry, hairpins, wallets, dentures, partial dental plates and hearing aids. You may wear contact lenses, and you may be able to wear your rings. We have a safe place to keep your personal items, but it is safer to leave them at home.   **IMPORTANT** THE INSTRUCTIONS BELOW ARE ONLY FOR THOSE PATIENTS WHO HAVE BEEN TOLD THEY WILL RECEIVE SEDATION OR GENERAL ANESTHESIA DURING THEIR MRI PROCEDURE:  IF YOU WILL RECEIVE SEDATION (take medicine to help you relax during your exam):   You must get the medicine from  your doctor before you arrive. Bring the medicine to the exam. Do not take it at home.   Arrive one hour early. Bring someone who can take you home after the test. Your medicine will make you sleepy. After the exam, you may not drive, take a bus or take a taxi by yourself.   No eating 8 hours before your exam. You may have clear liquids up until 4 hours before your exam. (Clear liquids include water, clear tea, black coffee and fruit juice without pulp.)  IF YOU WILL RECEIVE ANESTHESIA (be asleep for your exam):   Arrive 1 1/2 hours early. Bring someone who can take you home after the test. You may not drive, take a bus or take a taxi by yourself.   No eating 8 hours before your exam. You may have clear liquids up until 4 hours before your exam. (Clear liquids include water, clear tea, black coffee and fruit juice without pulp.)  Please call the Imaging Department at your exam site with any questions.              Future tests that were ordered for you today     Open Future Orders        Priority Expected Expires Ordered    MR Chest w/o & w Contrast Routine  6/1/2018 6/1/2017            Who to contact     If you have questions or need follow up information about today's clinic visit or your schedule please contact [facility] directly at [phone].  Normal or non-critical lab and imaging results will be communicated to you by [email or web address], letter or phone within 4 business days after the clinic has received the results. If you do not hear from us within 7 days, please contact the clinic through [email or web address] or phone. If you have a critical or abnormal lab result, we will notify you by phone as soon as possible.  Submit refill requests through [facility] or call your pharmacy and they will forward the refill request to us. Please allow 3 business days for your refill to be completed.          Additional Information About Your Visit        [facility] Information     [facility] gives you secure access to your  "electronic health record. If you see a primary care provider, you can also send messages to your care team and make appointments. If you have questions, please call your primary care clinic.  If you do not have a primary care provider, please call [phone] and they will assist you.        Care EveryWhere ID     This is your Care EveryWhere ID. This could be used by other organizations to access your [facility] medical records  [id number]        Your Vitals Were     Pulse Temperature Respirations Height Pulse Oximetry BMI (Body Mass Index)    89 98.5  F (36.9  C) (Oral) 16 1.702 m (5' 7.01\") 97% 27 kg/m2       Blood Pressure from Last 3 Encounters:   06/01/17 127/82   05/13/17 128/71    Weight from Last 3 Encounters:   06/01/17 78.2 kg (172 lb 6.4 oz)   05/11/17 79.4 kg (175 lb 1.6 oz)                 Today's Medication Changes          These changes are accurate as of: 6/1/17  4:09 PM.  If you have any questions, ask your nurse or doctor.               These medicines have changed or have updated prescriptions.        Dose/Directions    metoprolol 25 MG tablet   Commonly known as:  LOPRESSOR   This may have changed:  Another medication with the same name was removed. Continue taking this medication, and follow the directions you see here.   Used for:  Atrial fibrillation, new onset (H)        Dose:  12.5 mg   Take 0.5 tablets (12.5 mg) by mouth 2 times daily If well tolerated, should have further medication prescribed from primary doctor   Quantity:  30 tablet   Refills:  0                Primary Care Provider Office Phone # Fax #    Samuel Dubose [phone]       [facility] [address] [facility] [address]        Thank you!     Thank you for choosing [facility]  for your care. Our goal is always to provide you with excellent care. Hearing back from our patients is one way we can continue to improve our services. Please take a few minutes to complete the " written survey that you may receive in the mail after your visit with us. Thank you!             Your Updated Medication List - Protect others around you: Learn how to safely use, store and throw away your medicines at www.disposemymeds.org.          This list is accurate as of: 6/1/17  4:09 PM.  Always use your most recent med list.                   Brand Name Dispense Instructions for use    insulin glargine 100 UNIT/ML injection    LANTUS     Inject 10 Units Subcutaneous At Bedtime       METFORMIN HCL PO      Take 1,000 mg by mouth daily (with breakfast)       metoprolol 25 MG tablet    LOPRESSOR    30 tablet    Take 0.5 tablets (12.5 mg) by mouth 2 times daily If well tolerated, should have further medication prescribed from primary doctor       SIMVASTATIN PO      Take 10 mg by mouth At Bedtime       vitamin D 30645 UNIT capsule    ERGOCALCIFEROL     Take 50,000 Units by mouth Every Mon, Wed, Fri Morning

## 2017-06-01 NOTE — LETTER
6/1/2017       RE: Jf Schulz  [address]  [place] [place] [facility] [address]     Dear Colleague,    Thank you for referring your patient, Jf Schulz, to the [facility]. Please see a copy of my visit note below.    THORACIC SURGERY - NEW PATIENT OFFICE VISIT   Dear Dr. Dubose,    I saw Ms. Schulz at Dr. Medeiros s request in consultation for the evaluation and treatment of a mediastinal mass.     HPI  Ms. Jf Schulz is a 67 year old year-old female with a history of DM who was noted to have new onset atrial fibrillation and a 5.3cm subcarinal mass on 5/11 after presenting to the emergency department with dyspnea, chest pressure, palpitations and weight loss. Patient reports that these symptoms were lasting for approximately two months. Prior to this she was only have intermittent palpitations. At that time she was admitted to the hospital for further workup and evaluation. She underwent a bronchoscopy and biopsy of the mass with aspiration of purulent material on the first pass.    Currently, she states that she is feeling better than before. She occassionally has palpitations, but no chest pain and no shortness of breath. She doesn't feel like she has had any changes in her weight.     Previsit Tests   CT Chest on 5/11 shows a large subcarinal mass     FNA biopsy with histiocytes and acute inflammation without evidence of malignancy. Infectious studies thus far unremarkable.     PMH  Diabetes  Atrial fibrillation     PSH  None    ETOH none   TOB none    Physical examination  BMI 27  Unremarkable.   From a personal perspective, patient [place] in [place]. She was previously living in other states however now resides in [place]. Here she lives with relatives, including a niece, Tisha, who is with her today. Her son lives in [place].     IMPRESSION (J98.59) Mediastinal mass  (primary encounter diagnosis)  Plan: MR Chest w/o & w Contrast    67 year old year-old female  with a newly diagnosed mediastinal mass. Solid tumor vs bronchogenic cyst with superinfection.    PLAN  I spent a total of 30 minutes with Ms. Schulz, more than 50% of which were spent in counseling, coordination of care, and face-to-face time. I reviewed the plan as follows:   MRI to further characterize the mass. Pending the results of the MRI may consider a mediastinoscopy and  biopsy for diagnosis vs VATS resection.     All questions were answered and Jf Schulz and present family were in agreement with the plan.  I appreciate the opportunity to participate in the care of your patient and will keep you updated.  Sincerely,       Renetta Vinson MD

## 2017-06-09 LAB
BACTERIA SPEC CULT: NORMAL
FUNGUS SPEC CULT: NORMAL
MICRO REPORT STATUS: NORMAL
MICRO REPORT STATUS: NORMAL
SPECIMEN SOURCE: NORMAL
SPECIMEN SOURCE: NORMAL

## 2017-06-15 DIAGNOSIS — J98.59 MEDIASTINAL MASS: Primary | ICD-10-CM

## 2017-06-16 ENCOUNTER — HOSPITAL ENCOUNTER (INPATIENT)
Facility: CLINIC | Age: 67
Setting detail: SURGERY ADMIT
End: 2017-06-16
Attending: STUDENT IN AN ORGANIZED HEALTH CARE EDUCATION/TRAINING PROGRAM | Admitting: STUDENT IN AN ORGANIZED HEALTH CARE EDUCATION/TRAINING PROGRAM
Payer: COMMERCIAL

## 2017-06-21 DIAGNOSIS — J98.4 RIGHT PULMONARY LESION: Primary | ICD-10-CM

## 2017-06-27 LAB
ERV-%PRED-PRE: 56 %
ERV-PRE: 0.37 L
ERV-PRED: 0.66 L
EXPTIME-PRE: 2.46 SEC
FEF2575-%PRED-PRE: 103 %
FEF2575-PRE: 2.01 L/SEC
FEF2575-PRED: 1.94 L/SEC
FEFMAX-%PRED-PRE: 46 %
FEFMAX-PRE: 2.81 L/SEC
FEFMAX-PRED: 6.08 L/SEC
FEV1-%PRED-PRE: 83 %
FEV1-PRE: 1.87 L
FEV1FEV6-PRE: 91 %
FEV1FEV6-PRED: 80 %
FEV1FVC-PRE: 86 %
FEV1FVC-PRED: 79 %
FEV1SVC-PRE: 102 %
FEV1SVC-PRED: 68 %
FIFMAX-PRE: 1.56 L/SEC
FVC-%PRED-PRE: 76 %
FVC-PRE: 2.18 L
FVC-PRED: 2.85 L
IC-%PRED-PRE: 52 %
IC-PRE: 1.39 L
IC-PRED: 2.65 L
VC-%PRED-PRE: 55 %
VC-PRE: 1.84 L
VC-PRED: 3.31 L

## 2017-07-08 LAB
MICRO REPORT STATUS: NORMAL
MYCOBACTERIUM SPEC CULT: NORMAL
SPECIMEN SOURCE: NORMAL

## 2020-03-10 ENCOUNTER — HEALTH MAINTENANCE LETTER (OUTPATIENT)
Age: 70
End: 2020-03-10

## 2020-12-27 ENCOUNTER — HEALTH MAINTENANCE LETTER (OUTPATIENT)
Age: 70
End: 2020-12-27

## 2021-04-24 ENCOUNTER — HEALTH MAINTENANCE LETTER (OUTPATIENT)
Age: 71
End: 2021-04-24

## 2021-10-09 ENCOUNTER — HEALTH MAINTENANCE LETTER (OUTPATIENT)
Age: 71
End: 2021-10-09

## 2022-03-26 ENCOUNTER — HEALTH MAINTENANCE LETTER (OUTPATIENT)
Age: 72
End: 2022-03-26

## 2022-05-21 ENCOUNTER — HEALTH MAINTENANCE LETTER (OUTPATIENT)
Age: 72
End: 2022-05-21

## 2022-06-28 NOTE — PROGRESS NOTES
THORACIC SURGERY - NEW PATIENT OFFICE VISIT   Dear Dr. Dubose,    I saw Ms. Schulz at Dr. Medeiros s request in consultation for the evaluation and treatment of a mediastinal mass.     HPI  Ms. Jf Schulz is a 67 year old year-old female with a history of DM who was noted to have new onset atrial fibrillation and a 5.3cm subcarinal mass on 5/11 after presenting to the emergency department with dyspnea, chest pressure, palpitations and weight loss. Patient reports that these symptoms were lasting for approximately two months. Prior to this she was only have intermittent palpitations. At that time she was admitted to the hospital for further workup and evaluation. She underwent a bronchoscopy and biopsy of the mass with aspiration of purulent material on the first pass.    Currently, she states that she is feeling better than before. She occassionally has palpitations, but no chest pain and no shortness of breath. She doesn't feel like she has had any changes in her weight.     Previsit Tests   CT Chest on 5/11 shows a large subcarinal mass     FNA biopsy with histiocytes and acute inflammation without evidence of malignancy. Infectious studies thus far unremarkable.     PMH  Diabetes  Atrial fibrillation     PSH  None    ETOH none   TOB none    Physical examination  BMI 27  Unremarkable.   From a personal perspective, patient [place] in [place]. She was previously living in other states however now resides in [place]. Here she lives with relatives, including a niece, Tisha, who is with her today. Her son lives in [place].     IMPRESSION (J98.59) Mediastinal mass  (primary encounter diagnosis)  Plan: MR Chest w/o & w Contrast    67 year old year-old female with a newly diagnosed mediastinal mass. Solid tumor vs bronchogenic cyst with superinfection.    PLAN  I spent a total of 30 minutes with Ms. Schulz, more than 50% of which were spent in counseling, coordination of care, and  face-to-face time. I reviewed the plan as follows:   MRI to further characterize the mass. Pending the results of the MRI may consider a mediastinoscopy and  biopsy for diagnosis vs VATS resection.     All questions were answered and Jf Schulz and present family were in agreement with the plan.  I appreciate the opportunity to participate in the care of your patient and will keep you updated.  Sincerely,      Clindamycin Pregnancy And Lactation Text: This medication can be used in pregnancy if certain situations. Clindamycin is also present in breast milk.

## 2022-09-17 ENCOUNTER — HEALTH MAINTENANCE LETTER (OUTPATIENT)
Age: 72
End: 2022-09-17

## 2023-06-03 ENCOUNTER — HEALTH MAINTENANCE LETTER (OUTPATIENT)
Age: 73
End: 2023-06-03

## 2024-07-02 ENCOUNTER — VIRTUAL VISIT (OUTPATIENT)
Dept: INTERPRETER SERVICES | Facility: CLINIC | Age: 74
End: 2024-07-02
Payer: COMMERCIAL

## 2024-07-02 ENCOUNTER — APPOINTMENT (OUTPATIENT)
Dept: CT IMAGING | Facility: CLINIC | Age: 74
End: 2024-07-02
Attending: FAMILY MEDICINE
Payer: COMMERCIAL

## 2024-07-02 ENCOUNTER — APPOINTMENT (OUTPATIENT)
Dept: CT IMAGING | Facility: CLINIC | Age: 74
End: 2024-07-02
Attending: PHYSICIAN ASSISTANT
Payer: COMMERCIAL

## 2024-07-02 ENCOUNTER — HOSPITAL ENCOUNTER (OUTPATIENT)
Facility: CLINIC | Age: 74
Setting detail: OBSERVATION
Discharge: HOME | End: 2024-07-05
Attending: FAMILY MEDICINE | Admitting: STUDENT IN AN ORGANIZED HEALTH CARE EDUCATION/TRAINING PROGRAM
Payer: COMMERCIAL

## 2024-07-02 DIAGNOSIS — I60.9 SUBARACHNOID HEMORRHAGE (H): ICD-10-CM

## 2024-07-02 DIAGNOSIS — E11.9 TYPE 2 DIABETES MELLITUS WITHOUT COMPLICATION, WITHOUT LONG-TERM CURRENT USE OF INSULIN (H): ICD-10-CM

## 2024-07-02 DIAGNOSIS — I48.11 LONGSTANDING PERSISTENT ATRIAL FIBRILLATION (H): Primary | ICD-10-CM

## 2024-07-02 DIAGNOSIS — R53.1 GENERALIZED WEAKNESS: ICD-10-CM

## 2024-07-02 LAB
ALBUMIN SERPL BCG-MCNC: 3.4 G/DL (ref 3.5–5.2)
ALP SERPL-CCNC: ABNORMAL U/L
ALT SERPL W P-5'-P-CCNC: 6 U/L (ref 0–50)
ALT SERPL W P-5'-P-CCNC: ABNORMAL U/L
ANION GAP SERPL CALCULATED.3IONS-SCNC: 12 MMOL/L (ref 7–15)
APTT PPP: 28 SECONDS (ref 22–38)
AST SERPL W P-5'-P-CCNC: 13 U/L (ref 0–45)
AST SERPL W P-5'-P-CCNC: 33 U/L (ref 0–45)
BASOPHILS # BLD AUTO: 0 10E3/UL (ref 0–0.2)
BASOPHILS NFR BLD AUTO: 1 %
BILIRUB SERPL-MCNC: 0.6 MG/DL
BUN SERPL-MCNC: 12.6 MG/DL (ref 8–23)
CALCIUM SERPL-MCNC: 9.2 MG/DL (ref 8.8–10.2)
CF REDUC 60M P MA LENFR BLD TEG: 6.3 % (ref 0–15)
CFT BLD TEG: 0.9 MINUTE (ref 1–3)
CHLORIDE SERPL-SCNC: 99 MMOL/L (ref 98–107)
CI (COAGULATION INDEX)(Z) NON NATIVE: 3.6 (ref -3–3)
CLOT ANGLE BLD TEG: 76.4 DEGREES (ref 53–72)
CLOT INIT BLD TEG: 2.8 MINUTE (ref 5–10)
CLOT LYSIS 30M P MA LENFR BLD TEG: 2.6 % (ref 0–8)
CLOT STRENGTH BLD TEG: 8.7 KD/SC (ref 4.5–11)
CREAT SERPL-MCNC: 0.6 MG/DL (ref 0.51–0.95)
DEPRECATED HCO3 PLAS-SCNC: 24 MMOL/L (ref 22–29)
EGFRCR SERPLBLD CKD-EPI 2021: >90 ML/MIN/1.73M2
EOSINOPHIL # BLD AUTO: 0.1 10E3/UL (ref 0–0.7)
EOSINOPHIL NFR BLD AUTO: 2 %
ERYTHROCYTE [DISTWIDTH] IN BLOOD BY AUTOMATED COUNT: 13.3 % (ref 10–15)
GLUCOSE BLDC GLUCOMTR-MCNC: 150 MG/DL (ref 70–99)
GLUCOSE SERPL-MCNC: 217 MG/DL (ref 70–99)
HBA1C MFR BLD: 10.1 %
HCT VFR BLD AUTO: 34.4 % (ref 35–47)
HGB BLD-MCNC: 11.5 G/DL (ref 11.7–15.7)
IMM GRANULOCYTES # BLD: 0 10E3/UL
IMM GRANULOCYTES NFR BLD: 0 %
INR PPP: 1.02 (ref 0.85–1.15)
LYMPHOCYTES # BLD AUTO: 1.4 10E3/UL (ref 0.8–5.3)
LYMPHOCYTES NFR BLD AUTO: 26 %
MAGNESIUM SERPL-MCNC: 1.9 MG/DL (ref 1.7–2.3)
MCF BLD TEG: 63.5 MM (ref 50–70)
MCH RBC QN AUTO: 30.2 PG (ref 26.5–33)
MCHC RBC AUTO-ENTMCNC: 33.4 G/DL (ref 31.5–36.5)
MCV RBC AUTO: 90 FL (ref 78–100)
MONOCYTES # BLD AUTO: 0.5 10E3/UL (ref 0–1.3)
MONOCYTES NFR BLD AUTO: 10 %
NEUTROPHILS # BLD AUTO: 3.2 10E3/UL (ref 1.6–8.3)
NEUTROPHILS NFR BLD AUTO: 61 %
NRBC # BLD AUTO: 0 10E3/UL
NRBC BLD AUTO-RTO: 0 /100
PLATELET # BLD AUTO: 258 10E3/UL (ref 150–450)
POTASSIUM SERPL-SCNC: 5.2 MMOL/L (ref 3.4–5.3)
PROT SERPL-MCNC: 6.8 G/DL (ref 6.4–8.3)
RBC # BLD AUTO: 3.81 10E6/UL (ref 3.8–5.2)
SODIUM SERPL-SCNC: 135 MMOL/L (ref 135–145)
WBC # BLD AUTO: 5.3 10E3/UL (ref 4–11)

## 2024-07-02 PROCEDURE — 99418 PROLNG IP/OBS E/M EA 15 MIN: CPT | Performed by: INTERNAL MEDICINE

## 2024-07-02 PROCEDURE — 120N000002 HC R&B MED SURG/OB UMMC

## 2024-07-02 PROCEDURE — 70450 CT HEAD/BRAIN W/O DYE: CPT

## 2024-07-02 PROCEDURE — 83036 HEMOGLOBIN GLYCOSYLATED A1C: CPT | Performed by: PHYSICIAN ASSISTANT

## 2024-07-02 PROCEDURE — 99223 1ST HOSP IP/OBS HIGH 75: CPT | Performed by: INTERNAL MEDICINE

## 2024-07-02 PROCEDURE — 99285 EMERGENCY DEPT VISIT HI MDM: CPT | Performed by: FAMILY MEDICINE

## 2024-07-02 PROCEDURE — 258N000003 HC RX IP 258 OP 636: Performed by: PHYSICIAN ASSISTANT

## 2024-07-02 PROCEDURE — 83735 ASSAY OF MAGNESIUM: CPT | Performed by: PHYSICIAN ASSISTANT

## 2024-07-02 PROCEDURE — 82040 ASSAY OF SERUM ALBUMIN: CPT | Performed by: FAMILY MEDICINE

## 2024-07-02 PROCEDURE — 250N000012 HC RX MED GY IP 250 OP 636 PS 637: Performed by: PHYSICIAN ASSISTANT

## 2024-07-02 PROCEDURE — 36415 COLL VENOUS BLD VENIPUNCTURE: CPT | Performed by: FAMILY MEDICINE

## 2024-07-02 PROCEDURE — 258N000003 HC RX IP 258 OP 636: Performed by: FAMILY MEDICINE

## 2024-07-02 PROCEDURE — 85025 COMPLETE CBC W/AUTO DIFF WBC: CPT | Performed by: FAMILY MEDICINE

## 2024-07-02 PROCEDURE — 84450 TRANSFERASE (AST) (SGOT): CPT | Performed by: FAMILY MEDICINE

## 2024-07-02 PROCEDURE — T1013 SIGN LANG/ORAL INTERPRETER: HCPCS | Mod: U4,TEL,95

## 2024-07-02 PROCEDURE — 85396 CLOTTING ASSAY WHOLE BLOOD: CPT | Performed by: FAMILY MEDICINE

## 2024-07-02 PROCEDURE — 84155 ASSAY OF PROTEIN SERUM: CPT | Performed by: FAMILY MEDICINE

## 2024-07-02 PROCEDURE — 70450 CT HEAD/BRAIN W/O DYE: CPT | Mod: 77

## 2024-07-02 PROCEDURE — 85730 THROMBOPLASTIN TIME PARTIAL: CPT | Performed by: FAMILY MEDICINE

## 2024-07-02 PROCEDURE — 70450 CT HEAD/BRAIN W/O DYE: CPT | Mod: 26 | Performed by: RADIOLOGY

## 2024-07-02 PROCEDURE — 99285 EMERGENCY DEPT VISIT HI MDM: CPT | Mod: 25 | Performed by: FAMILY MEDICINE

## 2024-07-02 PROCEDURE — 99207 PR APP CREDIT; MD BILLING SHARED VISIT: CPT | Performed by: PHYSICIAN ASSISTANT

## 2024-07-02 PROCEDURE — 84460 ALANINE AMINO (ALT) (SGPT): CPT | Performed by: FAMILY MEDICINE

## 2024-07-02 PROCEDURE — 250N000013 HC RX MED GY IP 250 OP 250 PS 637: Performed by: FAMILY MEDICINE

## 2024-07-02 PROCEDURE — 96360 HYDRATION IV INFUSION INIT: CPT | Performed by: FAMILY MEDICINE

## 2024-07-02 PROCEDURE — 85610 PROTHROMBIN TIME: CPT | Performed by: FAMILY MEDICINE

## 2024-07-02 PROCEDURE — 99207 PR SERVICE NOT STAFFED W/SUPERV PROV: CPT | Performed by: NEUROLOGICAL SURGERY

## 2024-07-02 RX ORDER — ASPIRIN 81 MG/1
81 TABLET ORAL DAILY
Status: ON HOLD | COMMUNITY
End: 2024-07-05

## 2024-07-02 RX ORDER — ACETAMINOPHEN 500 MG
500-1000 TABLET ORAL EVERY 8 HOURS PRN
Status: ON HOLD | COMMUNITY
End: 2024-07-05

## 2024-07-02 RX ORDER — ATORVASTATIN CALCIUM 40 MG/1
40 TABLET, FILM COATED ORAL DAILY
Status: DISCONTINUED | OUTPATIENT
Start: 2024-07-03 | End: 2024-07-05 | Stop reason: HOSPADM

## 2024-07-02 RX ORDER — DEXTROSE MONOHYDRATE 25 G/50ML
25-50 INJECTION, SOLUTION INTRAVENOUS
Status: DISCONTINUED | OUTPATIENT
Start: 2024-07-02 | End: 2024-07-05 | Stop reason: HOSPADM

## 2024-07-02 RX ORDER — PANTOPRAZOLE SODIUM 40 MG/1
40 TABLET, DELAYED RELEASE ORAL
Status: DISCONTINUED | OUTPATIENT
Start: 2024-07-03 | End: 2024-07-05 | Stop reason: HOSPADM

## 2024-07-02 RX ORDER — SODIUM CHLORIDE, SODIUM LACTATE, POTASSIUM CHLORIDE, CALCIUM CHLORIDE 600; 310; 30; 20 MG/100ML; MG/100ML; MG/100ML; MG/100ML
INJECTION, SOLUTION INTRAVENOUS CONTINUOUS
Status: DISCONTINUED | OUTPATIENT
Start: 2024-07-02 | End: 2024-07-05 | Stop reason: HOSPADM

## 2024-07-02 RX ORDER — NALOXONE HYDROCHLORIDE 0.4 MG/ML
0.2 INJECTION, SOLUTION INTRAMUSCULAR; INTRAVENOUS; SUBCUTANEOUS
Status: DISCONTINUED | OUTPATIENT
Start: 2024-07-02 | End: 2024-07-05 | Stop reason: HOSPADM

## 2024-07-02 RX ORDER — AMOXICILLIN 250 MG
2 CAPSULE ORAL 2 TIMES DAILY PRN
Status: DISCONTINUED | OUTPATIENT
Start: 2024-07-02 | End: 2024-07-05 | Stop reason: HOSPADM

## 2024-07-02 RX ORDER — ACETAMINOPHEN 650 MG/1
650 SUPPOSITORY RECTAL EVERY 4 HOURS PRN
Status: DISCONTINUED | OUTPATIENT
Start: 2024-07-02 | End: 2024-07-05 | Stop reason: HOSPADM

## 2024-07-02 RX ORDER — OXYCODONE HYDROCHLORIDE 5 MG/1
5 TABLET ORAL EVERY 4 HOURS PRN
Status: DISCONTINUED | OUTPATIENT
Start: 2024-07-02 | End: 2024-07-05 | Stop reason: HOSPADM

## 2024-07-02 RX ORDER — LOPERAMIDE HYDROCHLORIDE 2 MG/1
2 TABLET ORAL 4 TIMES DAILY PRN
COMMUNITY

## 2024-07-02 RX ORDER — METOPROLOL SUCCINATE 25 MG/1
25 TABLET, EXTENDED RELEASE ORAL DAILY
Status: ON HOLD | COMMUNITY
End: 2024-07-05

## 2024-07-02 RX ORDER — AMOXICILLIN 250 MG
1 CAPSULE ORAL 2 TIMES DAILY PRN
Status: DISCONTINUED | OUTPATIENT
Start: 2024-07-02 | End: 2024-07-05 | Stop reason: HOSPADM

## 2024-07-02 RX ORDER — LABETALOL HYDROCHLORIDE 5 MG/ML
10 INJECTION, SOLUTION INTRAVENOUS EVERY 4 HOURS PRN
Status: DISCONTINUED | OUTPATIENT
Start: 2024-07-02 | End: 2024-07-05 | Stop reason: HOSPADM

## 2024-07-02 RX ORDER — GLIPIZIDE 10 MG/1
10 TABLET ORAL
COMMUNITY

## 2024-07-02 RX ORDER — CALCIUM CARBONATE 500 MG/1
1000 TABLET, CHEWABLE ORAL 4 TIMES DAILY PRN
Status: DISCONTINUED | OUTPATIENT
Start: 2024-07-02 | End: 2024-07-05 | Stop reason: HOSPADM

## 2024-07-02 RX ORDER — NALOXONE HYDROCHLORIDE 0.4 MG/ML
0.4 INJECTION, SOLUTION INTRAMUSCULAR; INTRAVENOUS; SUBCUTANEOUS
Status: DISCONTINUED | OUTPATIENT
Start: 2024-07-02 | End: 2024-07-05 | Stop reason: HOSPADM

## 2024-07-02 RX ORDER — ATORVASTATIN CALCIUM 40 MG/1
40 TABLET, FILM COATED ORAL DAILY
COMMUNITY

## 2024-07-02 RX ORDER — ACETAMINOPHEN 325 MG/1
650 TABLET ORAL EVERY 4 HOURS PRN
Status: DISCONTINUED | OUTPATIENT
Start: 2024-07-02 | End: 2024-07-05 | Stop reason: HOSPADM

## 2024-07-02 RX ORDER — LOSARTAN POTASSIUM 25 MG/1
25 TABLET ORAL DAILY
Status: DISCONTINUED | OUTPATIENT
Start: 2024-07-03 | End: 2024-07-05 | Stop reason: HOSPADM

## 2024-07-02 RX ORDER — ONDANSETRON 4 MG/1
4 TABLET, ORALLY DISINTEGRATING ORAL EVERY 6 HOURS PRN
Status: DISCONTINUED | OUTPATIENT
Start: 2024-07-02 | End: 2024-07-05 | Stop reason: HOSPADM

## 2024-07-02 RX ORDER — LIDOCAINE 40 MG/G
CREAM TOPICAL
Status: DISCONTINUED | OUTPATIENT
Start: 2024-07-02 | End: 2024-07-05 | Stop reason: HOSPADM

## 2024-07-02 RX ORDER — NICOTINE POLACRILEX 4 MG
15-30 LOZENGE BUCCAL
Status: DISCONTINUED | OUTPATIENT
Start: 2024-07-02 | End: 2024-07-05 | Stop reason: HOSPADM

## 2024-07-02 RX ORDER — LOSARTAN POTASSIUM 25 MG/1
25 TABLET ORAL DAILY
COMMUNITY

## 2024-07-02 RX ORDER — ONDANSETRON 2 MG/ML
4 INJECTION INTRAMUSCULAR; INTRAVENOUS EVERY 6 HOURS PRN
Status: DISCONTINUED | OUTPATIENT
Start: 2024-07-02 | End: 2024-07-05 | Stop reason: HOSPADM

## 2024-07-02 RX ORDER — ACETAMINOPHEN 500 MG
1000 TABLET ORAL ONCE
Status: COMPLETED | OUTPATIENT
Start: 2024-07-02 | End: 2024-07-02

## 2024-07-02 RX ORDER — CETIRIZINE HYDROCHLORIDE 10 MG/1
10 TABLET ORAL DAILY
COMMUNITY

## 2024-07-02 RX ADMIN — INSULIN ASPART 1 UNITS: 100 INJECTION, SOLUTION INTRAVENOUS; SUBCUTANEOUS at 21:58

## 2024-07-02 RX ADMIN — SODIUM CHLORIDE 1000 ML: 9 INJECTION, SOLUTION INTRAVENOUS at 11:25

## 2024-07-02 RX ADMIN — SODIUM CHLORIDE, POTASSIUM CHLORIDE, SODIUM LACTATE AND CALCIUM CHLORIDE: 600; 310; 30; 20 INJECTION, SOLUTION INTRAVENOUS at 20:17

## 2024-07-02 RX ADMIN — ACETAMINOPHEN 1000 MG: 500 TABLET ORAL at 16:12

## 2024-07-02 ASSESSMENT — ACTIVITIES OF DAILY LIVING (ADL)
ADLS_ACUITY_SCORE: 36
ADLS_ACUITY_SCORE: 34

## 2024-07-02 ASSESSMENT — COLUMBIA-SUICIDE SEVERITY RATING SCALE - C-SSRS
2. HAVE YOU ACTUALLY HAD ANY THOUGHTS OF KILLING YOURSELF IN THE PAST MONTH?: NO
6. HAVE YOU EVER DONE ANYTHING, STARTED TO DO ANYTHING, OR PREPARED TO DO ANYTHING TO END YOUR LIFE?: NO
1. IN THE PAST MONTH, HAVE YOU WISHED YOU WERE DEAD OR WISHED YOU COULD GO TO SLEEP AND NOT WAKE UP?: NO

## 2024-07-02 ASSESSMENT — ENCOUNTER SYMPTOMS: HEADACHES: 1

## 2024-07-02 NOTE — H&P
[facility]    History and Physical - Hospitalist Service, GOLD TEAM        Date of Admission:  7/2/2024    Assessment & Plan   Jf Schulz is a 74 year old female admitted on 7/2/2024 with new finding of subarachnoid hemorrhage. She has a history of T2DM, CVA, paroxysmal atrial fibrillation.     # Subarachnoid hemorrhage to R temporal convexity  # Fall  # L occipital scalp laceration and contusion s/p stitches  - Patient had an unwitnessed fall while traveling in [facility] [place] [facility] with family. She was found down and unconscious after family heard a thump at night. She was bleeding from a laceration to her L occipital region. She was taken to local hospital in [facility] and had head CT done which showed soft tissue swelling but no intracranial bleed or other acute abnormality. She woke up with their cares of her laceration, for which she had stitches placed. She did not remember the incident. She then developed headaches to her right head 3 days later, which have persisted since. They are helped with tylenol. She had been dizzy and with generalized weakness but this had been improving. They returned to the country on 6/30, and son brings her in today due to ongoing concerns of her dizziness.   - CT scan revealed small volume SAH in posterior right temporal convexity, diffuse cerebral volume loss c/w chronic small vessel ischemic disease  - On exam no focal deficits, strength 5/5 equal bilaterally, PERR, normal finger to nose,   Assessment:   Possible causes of fall: son speculates patient was hypoglycemic as she had not eaten much during that day of travel (to prevent from going to bathroom), and they had been fairly active. She is noted to be on both glipizide and sitagliptin on top of metformin in context of T2DM. Other notable risk factors include her hx of a fib. Son denies other concerns for falls recently including imbalance, weakness, or hx of falls. She has been living  independently.   Suspect traumatic cause of SAH. She developed severe HA to right head 3 days following her fall, without any other known trauma or injury.   Plan:   NSG consult  Repeat CT scan 6 hrs  SBP <140 mmHg  Home losartan and metoprolol as below  PRN labetalol 10 mg Q4H for SBP >160 mmHg  Plt >100  Will follow note for formal recs  Pain control: tylenol and oxycodone  Hold all AC including PTA aspirin  Patient will likely benefit from removal of stitches in the coming days  Consider MRI to eval for intracranial amyloid as this can predispose to spontaneous bleeds  Cardiac monitoring  NPO for now + LR at 50 mL/hr  PT eval and treat  RD consult    # Paroxysmal a fib  Diagnosed in 2017. Did not start AC as she was to have a surgery soon. During that surgical admission, she developed a MCA stroke as below. She was placed on warfarin for a few months before this was stopped as it was felt she had remained in sinus rhythm. She follows with cards with recent ziopatch. No recent CP, sob. Dizziness noted.   - cardiac monitoring   - cont PTA metoprolol with holding parameters  - mechanical DVT ppx    # T2DM  A1c 2021 was 7%. Has seen endocrinology in the past. At baseline she is on Janumet (sitagliptin + metformin) and glipizide. No insulin.   - Hold PTA Janumet, sitagliptin, metformin as c/f diminished po intake  - LSSI  - BG Q4H   - hypoglycemia protocol    # Right hip and upper leg pain  She states she hit this while using the toilet. Has been able to weight bear and usually with walking it feels better.   - pain regimen as above    # Mild normocytic anemia, acute  Possibly r/t acute bleeding  - trend    # MCA CVA (2017)  No baseline deficits.   - hold PTA aspirin 81 mg daily    # HTN  Goal to keep SBP <140 mmHg  - PTA losartan 25 mg daily with hold parameters  - PTA metoprolol 12.5 mg BID with hold parameters (sub for succinate 25 mg daily)  - labetalol 10 mg Q4H PRN for SBP >160    # Dyslipidemia - cont PTA  statin (once med rec complete)  # Hypomagnesemia   # Vit D deficiency        Diet:  NPO  DVT Prophylaxis: Pneumatic Compression Devices  Sullivan Catheter: Not present  Lines: None     Cardiac Monitoring: None  Code Status:  full code for now, patient's son wanted to discuss with patient's daughter    Clinically Significant Risk Factors Present on Admission              # Hypoalbuminemia: Lowest albumin = 3.4 g/dL at 7/2/2024 11:24 AM, will monitor as appropriate   # Drug Induced Platelet Defect: home medication list includes an antiplatelet medication                          Disposition Plan     Medically Ready for Discharge: Anticipated in 2-4 Days         The patient's care was discussed with the Attending Physician, Dr. Lind, Bedside Nurse, and Patient.    Cherelle Link PA-C  Hospitalist Service, [facility]  Securely message with [employer] (more info)  Text page via [facility] Paging/Directory   See signed in provider for up to date coverage information    ______________________________________________________________________    Chief Complaint   Right headache    History is obtained from the patient and her son.     History of Present Illness   Jf Schulz is a 74 year old female who is seen at bedside with the help of her son. Patient had an unwitnessed fall while traveling in [facility] with family. She was found down and unconscious after family heard a thump at night. She was bleeding from a laceration to her L occipital region. She was taken to [facility] hospital in [facility] and had head CT done which showed soft tissue swelling but no intracranial bleed or other acute abnormality. She woke up with their cares of her laceration, for which she had stitches placed. She did not remember the incident. She then developed headaches to her right head 3 days later, which have persisted since. They are helped with tylenol. She had been dizzy and with generalized weakness but  this had been improving. They returned to the country on 6/30, and son brings her in today due to ongoing concerns of her dizziness. She has been eating and drinking and able to feed herself. She has not had any recent CP, SOB, abd pain, changes to her stool, cough.       Past Medical History    See above.       Past Surgical History   Past Surgical History:   Procedure Laterality Date    ENDOBRONCHIAL ULTRASOUND FLEXIBLE N/A 5/12/2017    Procedure: ENDOBRONCHIAL ULTRASOUND FLEXIBLE;;  Surgeon: Khurram Medeiros MD;  Location: [facility]       Prior to Admission Medications   Prior to Admission Medications   Prescriptions Last Dose Informant Patient Reported? Taking?   METFORMIN HCL PO 7/2/2024  Yes Yes   Sig: Take 1,000 mg by mouth daily (with breakfast)    SIMVASTATIN PO 7/2/2024  Yes Yes   Sig: Take 10 mg by mouth At Bedtime   aspirin 81 MG EC tablet 7/2/2024  Yes Yes   Sig: Take 81 mg by mouth daily   insulin glargine (LANTUS) 100 UNIT/ML injection   Yes No   Sig: Inject 10 Units Subcutaneous At Bedtime    metoprolol (LOPRESSOR) 25 MG tablet   No No   Sig: Take 0.5 tablets (12.5 mg) by mouth 2 times daily If well tolerated, should have further medication prescribed from primary doctor   vitamin D (ERGOCALCIFEROL) 77951 UNIT capsule   Yes No   Sig: Take 50,000 Units by mouth Every Mon, Wed, Fri Morning      Facility-Administered Medications: None        Review of Systems    The 10 point Review of Systems is negative other than noted in the HPI.     Physical Exam   Vital Signs: Temp: 97.8  F (36.6  C) Temp src: Oral BP: 133/79 Pulse: 93   Resp: 23 SpO2: 97 % O2 Device: None (Room air)    Weight: 0 lbs 0 oz    GENERAL: adult female seen resting supine in bed. NAD.   NEURO / PSYCH: Alert, oriented. Tired and not very interactive. 5/5 strength symmetrically to all 4 extremities. No focal deficits. No facial droop. PERRL. Adequate finger to nose testing.   HEENT: Anicteric sclera. PERRL.   CV: RRR. S1, S2. No murmurs  appreciated.  2+ left radial pulse.  RESPIRATORY: Effort normal. Lungs CTAB with no wheezing, rales, rhonchi.   GI: Abdomen soft and non distended with bowel sounds present. No tenderness, rebound, guarding.   MSK: no gross deformities.   EXTREMITIES:  nonedematous  SKIN: No jaundice. No rashes or lesions to exposed areas.       Medical Decision Making       90 MINUTES SPENT BY ME on the date of service doing chart review, history, exam, documentation & further activities per the note.      Data     I have personally reviewed the following data over the past 24 hrs:    5.3  \   11.5 (L)   / 258     135 99 12.6 /  217 (H)   5.2 24 0.60 \     ALT: 6 AST: 13 AP: N/A TBILI: 0.6   ALB: 3.4 (L) TOT PROTEIN: 6.8 LIPASE: N/A     TSH: N/A T4: N/A A1C: 10.1 (H)     INR:  1.02 PTT:  28   D-dimer:  N/A Fibrinogen:  N/A

## 2024-07-02 NOTE — ED TRIAGE NOTES
Triage Assessment (Adult)       Row Name 07/02/24 1053          Triage Assessment    Airway WDL WDL        Respiratory WDL    Respiratory WDL WDL        Skin Circulation/Temperature WDL    Skin Circulation/Temperature WDL WDL        Cardiac WDL    Cardiac WDL WDL        Peripheral/Neurovascular WDL    Peripheral Neurovascular WDL WDL        Cognitive/Neuro/Behavioral WDL    Cognitive/Neuro/Behavioral WDL WDL

## 2024-07-02 NOTE — CONSULTS
[facility]         NEUROSURGERY CONSULTATION    This consultation was requested by Dr. Lott from the Emergency Department.    Reason for Consultation: Traumatic SAH    HPI:  Jf Schulz is a 74 year-old female with a notable PMHx of DM, ASA 81 mg (indication unclear), presenting with persistent headaches since she fell in [facility] on 6/16. At that time, a CT scan was obtained which did not show any intracranial bleeding (printed scans were present and was reviewed at the bedside). However, she continued to have right frontal and left occipital headaches since that fall. Daughter and son were at bedside to provide further history, and they do not recall any episodes of syncope or falls recently. CT scan in the ED showed a small volume SAH along the right lateral temporal lobe. Neurosurgery was consulted for recommendations.        ROS: 10 point ROS of systems including Constitutional, Eyes, Respiratory, Cardiovascular, Gastroenterology, Genitourinary, Integumentary, Muscularskeletal, Psychiatric were all negative except for pertinent positives noted in my HPI.    Physical exam:   Blood pressure 133/79, pulse 93, temperature 97.8  F (36.6  C), temperature source Oral, resp. rate 23, SpO2 97%.  General: In moderate distress due to headaches   HEENT: Atraumatic   PULM: Non labored breathing on room air  NEUROLOGIC: (Performed with son interpreting as [facility]  was not available)   -- Awake; Alert; oriented x 3 (knows the month but not year)   -- Follows commands briskly  -- Speech fluent, spontaneous. No aphasia or dysarthria.  -- no gaze preference. No apparent hemineglect.    Cranial Nerves:  -- Visual fields full to confrontation, PERRL 3-2mm bilat and brisk, extraocular movements intact  -- face symmetrical, tongue midline  -- sensory V1-V3 intact bilaterally  -- palate elevates symmetrically, uvula midline  -- hearing grossly intact bilat  -- Trapezii  5/5 strength bilat symmetric    Motor:  Normal bulk / tone; no tremor, rigidity, or bradykinesia.  No muscle wasting or fasciculations  No Pronator Drift     Delt Bi Tri Hand Flexion/  Extension Iliopsoas Quadriceps Hamstrings Tibialis Anterior Gastroc    C5 C6 C7 C8/T1 L2 L3 L4-S1 L4 S1   R 5 5 5 5 5 5 5 5 5   L 5 5 5 5 5 5 5 5 5     Sensory:  intact to LT x 4 extremities    Reflexes: no clonus       Bi Tri BR Alis Pat Ach     C5-6 C7-8 C6 UMN L2-4 S1   R 2+ 2+ 2+ Norm 2+ 2+   L 2+ 2+ 2+ Norm 2+ 2+      Gait: deferred     ASSESSMENT:  74 year-old female with a notable PMHx of DM, ASA 81 mg (indication unclear), presenting with persistent headaches since she fell in [facility] on 6/16. Initial head CT negative. Found to have a small volume SAH along the right lateral temporal lobe on CT in the ED. Unremarkable neurological exam. No history of hypertension per family.     RECOMMENDATIONS:  -- No neurosurgical intervention indicated at this time   -- Agree with admission to the Medicine service   -- Repeat head CT 6 hours from the initial scan   -- Plt > 100   -- INR < 1.5   -- SBP < 140  -- Continue to hold ASA   -- Neurosurgery will continue to follow     The patient's presentation, exam, and imaging were discussed with Dr. Ferrell, neurosurgery chief resident, and Dr. Moralez, neurosurgery staff.    Eric Mora MD, PGY-1  Department of Neurosurgery  Pager: [phone]    Please contact neurosurgery resident on call with questions.    Dial * * [phone], enter [phone] when prompted.     Associated Relevant Data in the Medical Record:  PAST MEDICAL HISTORY:   Past Medical History:   Diagnosis Date    Atrial fibrillation (H)     Diabetes mellitus (H)      PAST SURGICAL HISTORY:   Past Surgical History:   Procedure Laterality Date    ENDOBRONCHIAL ULTRASOUND FLEXIBLE N/A 5/12/2017    Procedure: ENDOBRONCHIAL ULTRASOUND FLEXIBLE;;  Surgeon: Khurram Medeiros MD;  Location: [facility] [place]     FAMILY HISTORY: No family history on  file.    SOCIAL HISTORY:   Social History     Tobacco Use    Smoking status: Never    Smokeless tobacco: Not on file   Substance Use Topics    Alcohol use: No       MEDICATIONS:  Current Outpatient Medications   Medication Sig Dispense Refill    aspirin 81 MG EC tablet Take 81 mg by mouth daily      METFORMIN HCL PO Take 1,000 mg by mouth daily (with breakfast)       SIMVASTATIN PO Take 10 mg by mouth At Bedtime      insulin glargine (LANTUS) 100 UNIT/ML injection Inject 10 Units Subcutaneous At Bedtime       metoprolol (LOPRESSOR) 25 MG tablet Take 0.5 tablets (12.5 mg) by mouth 2 times daily If well tolerated, should have further medication prescribed from primary doctor 30 tablet 0    vitamin D (ERGOCALCIFEROL) 05780 UNIT capsule Take 50,000 Units by mouth Every Mon, Wed, Fri Morning         Allergies:  No Known Allergies    IMAGING:  Recent Results (from the past 24 hour(s))   CT Head w/o Contrast    Narrative    CT OF THE HEAD WITHOUT CONTRAST July 2, 2024 2:35 PM     HISTORY: Fall, loss of consciousness.    TECHNIQUE: 5 mm thick axial CT images of the head were acquired  without IV contrast material. Radiation dose for this scan was reduced  using automated exposure control, adjustment of the mA and/or kV  according to patient size, or iterative reconstruction technique.    COMPARISON: None available.    FINDINGS: There is a small volume subarachnoid hemorrhage along the  lateral right temporal lobe convexity likely due to recent trauma. No  other intracranial hemorrhage. There is mild diffuse cerebral volume  loss. There are subtle patchy areas of decreased density in the  cerebral white matter bilaterally that are consistent with sequela of  chronic small vessel ischemic disease. The ventricles and basal  cisterns are within normal limits in configuration given the degree of  cerebral volume loss. There is no midline shift. There are no  extra-axial fluid collections.    No intracranial mass or recent  infarct.    The visualized paranasal sinuses are well-aerated. There is no  mastoiditis. There are no fractures of the visualized bones.       Impression    IMPRESSION:   1. Small volume subarachnoid hemorrhage along the posterior right  temporal convexity. No other intracranial hemorrhage.  2. Diffuse cerebral volume loss and cerebral white matter changes  consistent with chronic small vessel ischemic disease. [Critical  Result: Intracranial hemorrhage]    Finding was identified on 7/2/2024 2:49 PM.     JOVON OSHEA was contacted by Dr. Spring on 7/2/2024 2:52  PM and verbalized understanding of the critical result.          TARA SPRING MD         SYSTEM ID:  [id number]       LABS:   Last Comprehensive Metabolic Panel:  Lab Results   Component Value Date     07/02/2024    POTASSIUM 5.2 07/02/2024    CHLORIDE 99 07/02/2024    CO2 24 07/02/2024    ANIONGAP 12 07/02/2024     (H) 07/02/2024    BUN 12.6 07/02/2024    CR 0.60 07/02/2024    GFRESTIMATED >90 07/02/2024    NAYELI 9.2 07/02/2024         Lab Results   Component Value Date    WBC 5.3 07/02/2024    WBC 4.0 05/13/2017     Lab Results   Component Value Date    RBC 3.81 07/02/2024    RBC 3.97 05/13/2017     Lab Results   Component Value Date    HGB 11.5 07/02/2024    HGB 11.9 05/13/2017     Lab Results   Component Value Date    HCT 34.4 07/02/2024    HCT 36.7 05/13/2017     Lab Results   Component Value Date    MCV 90 07/02/2024    MCV 92 05/13/2017     Lab Results   Component Value Date    MCH 30.2 07/02/2024    MCH 30.0 05/13/2017     Lab Results   Component Value Date    MCHC 33.4 07/02/2024    MCHC 32.4 05/13/2017     Lab Results   Component Value Date    RDW 13.3 07/02/2024    RDW 13.1 05/13/2017     Lab Results   Component Value Date     07/02/2024     05/13/2017     INR   Date Value Ref Range Status   07/02/2024 1.02 0.85 - 1.15 Final   05/11/2017 0.95 0.86 - 1.14 Final      aPTT   Date Value Ref Range Status   07/02/2024  28 22 - 38 Seconds Final

## 2024-07-02 NOTE — PROGRESS NOTES
Spoke with Dr. Lott    In [facility] possible syncopal event with trip and fall (6/16), eval there including CT was negative. No other falls by history.  Returned home here and has been having issues with HA, FTT, not eating/drinking well, balance issues.    Subarachnoid bleed on head imaging here.    Neurosurg consulted and has seen, speaking with staff.    NS didn't think she needed admission to them, recommended admission to medicine with NS consult.     Admit for PT/OT eval; home vs TCU, treatment of deficits, nutrition plan, etc    Update at 1803: NS wants follow up head CT at 6 hours from previous (~2030) along with bp control (last 133/79, parameters to be in consult note) and plt >100 (presently over 250).    Juanito Ortega DO  Hospitalist    Medicine and Pediatrics  [email or web address]  Pager: [phone]  Available on Carine

## 2024-07-02 NOTE — ED PROVIDER NOTES
ED Provider Note  [facility]      History     Chief Complaint   Patient presents with    Headache     Fell 6/16 in [facility] and seen by ED there.  Today continues to have headache and weakness.  Pain on right side.  LOC when originally fell     The history is provided by the patient, medical records and a relative.   Dima Schulz is a 74 year old female with history of PAF and DMII who presents to the ED for ongoing headaches and weakness after a fall. Patient fell and lost consciousness while in [facility] on 6/16 and was seen in the ED there. Since then she has continued to feel unwell with headaches, dizziness, and generalized weakness as well as decreased appetite and PO intake.     Past Medical History  Past Medical History:   Diagnosis Date    Atrial fibrillation (H)     Diabetes mellitus (H)      Past Surgical History:   Procedure Laterality Date    ENDOBRONCHIAL ULTRASOUND FLEXIBLE N/A 5/12/2017    Procedure: ENDOBRONCHIAL ULTRASOUND FLEXIBLE;;  Surgeon: Khurram Medeiros MD;  Location:  GI     aspirin 81 MG EC tablet  METFORMIN HCL PO  SIMVASTATIN PO  insulin glargine (LANTUS) 100 UNIT/ML injection  metoprolol (LOPRESSOR) 25 MG tablet  vitamin D (ERGOCALCIFEROL) 57384 UNIT capsule      No Known Allergies  Family History  No family history on file.  Social History   Social History     Tobacco Use    Smoking status: Never   Substance Use Topics    Alcohol use: No    Drug use: No      A medically appropriate review of systems was performed with pertinent positives and negatives noted in the HPI, and all other systems negative.    Physical Exam   BP: 121/79  Pulse: 109  Temp: 97.8  F (36.6  C)  Resp: 16  SpO2: 100 %  Physical Exam  Constitutional:       General: She is not in acute distress.     Appearance: Normal appearance. She is not diaphoretic.   HENT:      Head: Atraumatic.      Mouth/Throat:      Mouth: Mucous membranes are moist.   Eyes:      General: No scleral  icterus.     Conjunctiva/sclera: Conjunctivae normal.   Cardiovascular:      Rate and Rhythm: Normal rate.      Heart sounds: Normal heart sounds.   Pulmonary:      Effort: No respiratory distress.      Breath sounds: Normal breath sounds.   Abdominal:      General: Abdomen is flat.   Musculoskeletal:      Cervical back: Neck supple.   Skin:     General: Skin is warm.      Findings: No rash.   Neurological:      General: No focal deficit present.      Mental Status: She is alert and oriented to person, place, and time.      Cranial Nerves: No cranial nerve deficit.      Sensory: No sensory deficit.      Motor: Weakness present.      Coordination: Coordination normal.      Deep Tendon Reflexes: Reflexes normal.           ED Course, Procedures, & Data        Results for orders placed or performed during the hospital encounter of 07/02/24   CT Head w/o Contrast     Status: None    Narrative    CT OF THE HEAD WITHOUT CONTRAST July 2, 2024 2:35 PM     HISTORY: Fall, loss of consciousness.    TECHNIQUE: 5 mm thick axial CT images of the head were acquired  without IV contrast material. Radiation dose for this scan was reduced  using automated exposure control, adjustment of the mA and/or kV  according to patient size, or iterative reconstruction technique.    COMPARISON: None available.    FINDINGS: There is a small volume subarachnoid hemorrhage along the  lateral right temporal lobe convexity likely due to recent trauma. No  other intracranial hemorrhage. There is mild diffuse cerebral volume  loss. There are subtle patchy areas of decreased density in the  cerebral white matter bilaterally that are consistent with sequela of  chronic small vessel ischemic disease. The ventricles and basal  cisterns are within normal limits in configuration given the degree of  cerebral volume loss. There is no midline shift. There are no  extra-axial fluid collections.    No intracranial mass or recent infarct.    The visualized  paranasal sinuses are well-aerated. There is no  mastoiditis. There are no fractures of the visualized bones.       Impression    IMPRESSION:   1. Small volume subarachnoid hemorrhage along the posterior right  temporal convexity. No other intracranial hemorrhage.  2. Diffuse cerebral volume loss and cerebral white matter changes  consistent with chronic small vessel ischemic disease. [Critical  Result: Intracranial hemorrhage]    Finding was identified on 7/2/2024 2:49 PM.     JOVON OSHEA was contacted by Dr. Spring on 7/2/2024 2:52  PM and verbalized understanding of the critical result.          TARA SPRING MD         SYSTEM ID:  [id number]   Comprehensive metabolic panel     Status: Abnormal   Result Value Ref Range    Sodium 135 135 - 145 mmol/L    Potassium 5.2 3.4 - 5.3 mmol/L    Carbon Dioxide (CO2) 24 22 - 29 mmol/L    Anion Gap 12 7 - 15 mmol/L    Urea Nitrogen 12.6 8.0 - 23.0 mg/dL    Creatinine 0.60 0.51 - 0.95 mg/dL    GFR Estimate >90 >60 mL/min/1.73m2    Calcium 9.2 8.8 - 10.2 mg/dL    Chloride 99 98 - 107 mmol/L    Glucose 217 (H) 70 - 99 mg/dL    Alkaline Phosphatase      AST 33 0 - 45 U/L    ALT      Protein Total 6.8 6.4 - 8.3 g/dL    Albumin 3.4 (L) 3.5 - 5.2 g/dL    Bilirubin Total 0.6 <=1.2 mg/dL   CBC with platelets and differential     Status: Abnormal   Result Value Ref Range    WBC Count 5.3 4.0 - 11.0 10e3/uL    RBC Count 3.81 3.80 - 5.20 10e6/uL    Hemoglobin 11.5 (L) 11.7 - 15.7 g/dL    Hematocrit 34.4 (L) 35.0 - 47.0 %    MCV 90 78 - 100 fL    MCH 30.2 26.5 - 33.0 pg    MCHC 33.4 31.5 - 36.5 g/dL    RDW 13.3 10.0 - 15.0 %    Platelet Count 258 150 - 450 10e3/uL    % Neutrophils 61 %    % Lymphocytes 26 %    % Monocytes 10 %    % Eosinophils 2 %    % Basophils 1 %    % Immature Granulocytes 0 %    NRBCs per 100 WBC 0 <1 /100    Absolute Neutrophils 3.2 1.6 - 8.3 10e3/uL    Absolute Lymphocytes 1.4 0.8 - 5.3 10e3/uL    Absolute Monocytes 0.5 0.0 - 1.3 10e3/uL    Absolute  Eosinophils 0.1 0.0 - 0.7 10e3/uL    Absolute Basophils 0.0 0.0 - 0.2 10e3/uL    Absolute Immature Granulocytes 0.0 <=0.4 10e3/uL    Absolute NRBCs 0.0 10e3/uL   INR     Status: Normal   Result Value Ref Range    INR 1.02 0.85 - 1.15   Partial thromboplastin time     Status: Normal   Result Value Ref Range    aPTT 28 22 - 38 Seconds   AST     Status: Normal   Result Value Ref Range    AST 13 0 - 45 U/L   ALT     Status: Normal   Result Value Ref Range    ALT 6 0 - 50 U/L   CBC with platelets differential     Status: Abnormal    Narrative    The following orders were created for panel order CBC with platelets differential.  Procedure                               Abnormality         Status                     ---------                               -----------         ------                     CBC with platelets and d...[[id number]]  Abnormal            Final result                 Please view results for these tests on the individual orders.     Medications   sodium chloride 0.9% BOLUS 1,000 mL (0 mLs Intravenous Stopped 7/2/24 1230)   acetaminophen (TYLENOL) tablet 1,000 mg (1,000 mg Oral $Given 7/2/24 1612)     Labs Ordered and Resulted from Time of ED Arrival to Time of ED Departure   COMPREHENSIVE METABOLIC PANEL - Abnormal       Result Value    Sodium 135      Potassium 5.2      Carbon Dioxide (CO2) 24      Anion Gap 12      Urea Nitrogen 12.6      Creatinine 0.60      GFR Estimate >90      Calcium 9.2      Chloride 99      Glucose 217 (*)     Alkaline Phosphatase        AST 33      ALT        Protein Total 6.8      Albumin 3.4 (*)     Bilirubin Total 0.6     CBC WITH PLATELETS AND DIFFERENTIAL - Abnormal    WBC Count 5.3      RBC Count 3.81      Hemoglobin 11.5 (*)     Hematocrit 34.4 (*)     MCV 90      MCH 30.2      MCHC 33.4      RDW 13.3      Platelet Count 258      % Neutrophils 61      % Lymphocytes 26      % Monocytes 10      % Eosinophils 2      % Basophils 1      % Immature Granulocytes 0      NRBCs  per 100 WBC 0      Absolute Neutrophils 3.2      Absolute Lymphocytes 1.4      Absolute Monocytes 0.5      Absolute Eosinophils 0.1      Absolute Basophils 0.0      Absolute Immature Granulocytes 0.0      Absolute NRBCs 0.0     INR - Normal    INR 1.02     PARTIAL THROMBOPLASTIN TIME - Normal    aPTT 28     AST - Normal    AST 13     ALT - Normal    ALT 6     TEG WITHOUT HEPARINASE     CT Head w/o Contrast   Final Result   IMPRESSION:    1. Small volume subarachnoid hemorrhage along the posterior right   temporal convexity. No other intracranial hemorrhage.   2. Diffuse cerebral volume loss and cerebral white matter changes   consistent with chronic small vessel ischemic disease. [Critical   Result: Intracranial hemorrhage]      Finding was identified on 7/2/2024 2:49 PM.       YURY OSHEA was contacted by Dr. Spring on 7/2/2024 2:52   PM and verbalized understanding of the critical result.              TARA SPRING MD            SYSTEM ID:  [id number]             Critical care was not performed.     Medical Decision Making  The patient's presentation was of high complexity (an acute health issue posing potential threat to life or bodily function).    The patient's evaluation involved:  ordering and/or review of 3+ test(s) in this encounter (see separate area of note for details)  independent interpretation of testing performed by another health professional (patient seen and evaluated by neurosurgery consulted with their care as well as with medicine.)    The patient's management necessitated high risk (a decision regarding hospitalization).    Assessment & Plan        I have reviewed the nursing notes. I have reviewed the findings, diagnosis, plan and need for follow up with the patient.        Final diagnoses:   Subarachnoid hemorrhage (H)   Generalized weakness       Yury Oshea MD  [facility] EMERGENCY DEPARTMENT  7/2/2024     Yury Oshea MD  07/02/24  [id number]

## 2024-07-03 LAB
ANION GAP SERPL CALCULATED.3IONS-SCNC: 11 MMOL/L (ref 7–15)
BLAIMP ISLT/SPM QL: NOT DETECTED
BLAKPC ISLT/SPM QL: NOT DETECTED
BLAOXA-48 ISLT/SPM QL: NOT DETECTED
BLAVIM ISLT/SPM QL: NOT DETECTED
BUN SERPL-MCNC: 9.5 MG/DL (ref 8–23)
CALCIUM SERPL-MCNC: 8.9 MG/DL (ref 8.8–10.2)
CHLORIDE SERPL-SCNC: 103 MMOL/L (ref 98–107)
CREAT SERPL-MCNC: 0.6 MG/DL (ref 0.51–0.95)
DEPRECATED HCO3 PLAS-SCNC: 24 MMOL/L (ref 22–29)
EGFRCR SERPLBLD CKD-EPI 2021: >90 ML/MIN/1.73M2
ERYTHROCYTE [DISTWIDTH] IN BLOOD BY AUTOMATED COUNT: 13 % (ref 10–15)
GLUCOSE BLDC GLUCOMTR-MCNC: 151 MG/DL (ref 70–99)
GLUCOSE BLDC GLUCOMTR-MCNC: 156 MG/DL (ref 70–99)
GLUCOSE BLDC GLUCOMTR-MCNC: 160 MG/DL (ref 70–99)
GLUCOSE BLDC GLUCOMTR-MCNC: 160 MG/DL (ref 70–99)
GLUCOSE BLDC GLUCOMTR-MCNC: 222 MG/DL (ref 70–99)
GLUCOSE BLDC GLUCOMTR-MCNC: 232 MG/DL (ref 70–99)
GLUCOSE BLDC GLUCOMTR-MCNC: 234 MG/DL (ref 70–99)
GLUCOSE BLDC GLUCOMTR-MCNC: 334 MG/DL (ref 70–99)
GLUCOSE SERPL-MCNC: 168 MG/DL (ref 70–99)
HCT VFR BLD AUTO: 35.1 % (ref 35–47)
HGB BLD-MCNC: 11.7 G/DL (ref 11.7–15.7)
MCH RBC QN AUTO: 30.3 PG (ref 26.5–33)
MCHC RBC AUTO-ENTMCNC: 33.3 G/DL (ref 31.5–36.5)
MCV RBC AUTO: 91 FL (ref 78–100)
NDM TARGET DNA: NOT DETECTED
PLATELET # BLD AUTO: 259 10E3/UL (ref 150–450)
POTASSIUM SERPL-SCNC: 4.3 MMOL/L (ref 3.4–5.3)
RBC # BLD AUTO: 3.86 10E6/UL (ref 3.8–5.2)
SODIUM SERPL-SCNC: 138 MMOL/L (ref 135–145)
WBC # BLD AUTO: 5 10E3/UL (ref 4–11)

## 2024-07-03 PROCEDURE — 87798 DETECT AGENT NOS DNA AMP: CPT | Performed by: INTERNAL MEDICINE

## 2024-07-03 PROCEDURE — 258N000003 HC RX IP 258 OP 636: Performed by: PHYSICIAN ASSISTANT

## 2024-07-03 PROCEDURE — 36415 COLL VENOUS BLD VENIPUNCTURE: CPT | Performed by: PHYSICIAN ASSISTANT

## 2024-07-03 PROCEDURE — 85027 COMPLETE CBC AUTOMATED: CPT | Performed by: PHYSICIAN ASSISTANT

## 2024-07-03 PROCEDURE — 80048 BASIC METABOLIC PNL TOTAL CA: CPT | Performed by: PHYSICIAN ASSISTANT

## 2024-07-03 PROCEDURE — 82962 GLUCOSE BLOOD TEST: CPT

## 2024-07-03 PROCEDURE — 250N000013 HC RX MED GY IP 250 OP 250 PS 637: Performed by: PHYSICIAN ASSISTANT

## 2024-07-03 PROCEDURE — G0378 HOSPITAL OBSERVATION PER HR: HCPCS

## 2024-07-03 PROCEDURE — 99233 SBSQ HOSP IP/OBS HIGH 50: CPT | Performed by: STUDENT IN AN ORGANIZED HEALTH CARE EDUCATION/TRAINING PROGRAM

## 2024-07-03 RX ADMIN — PANTOPRAZOLE SODIUM 40 MG: 40 TABLET, DELAYED RELEASE ORAL at 08:17

## 2024-07-03 RX ADMIN — ACETAMINOPHEN 650 MG: 325 TABLET, FILM COATED ORAL at 11:50

## 2024-07-03 RX ADMIN — ACETAMINOPHEN 650 MG: 325 TABLET, FILM COATED ORAL at 06:21

## 2024-07-03 RX ADMIN — Medication 2.5 MG: at 04:18

## 2024-07-03 RX ADMIN — Medication 12.5 MG: at 08:16

## 2024-07-03 RX ADMIN — ACETAMINOPHEN 650 MG: 325 TABLET, FILM COATED ORAL at 20:33

## 2024-07-03 RX ADMIN — LOSARTAN POTASSIUM 25 MG: 25 TABLET, FILM COATED ORAL at 08:16

## 2024-07-03 RX ADMIN — INSULIN ASPART 1 UNITS: 100 INJECTION, SOLUTION INTRAVENOUS; SUBCUTANEOUS at 08:17

## 2024-07-03 RX ADMIN — ATORVASTATIN CALCIUM 40 MG: 40 TABLET, FILM COATED ORAL at 08:16

## 2024-07-03 RX ADMIN — INSULIN ASPART 1 UNITS: 100 INJECTION, SOLUTION INTRAVENOUS; SUBCUTANEOUS at 04:18

## 2024-07-03 RX ADMIN — INSULIN ASPART 2 UNITS: 100 INJECTION, SOLUTION INTRAVENOUS; SUBCUTANEOUS at 20:37

## 2024-07-03 RX ADMIN — INSULIN ASPART 1 UNITS: 100 INJECTION, SOLUTION INTRAVENOUS; SUBCUTANEOUS at 11:51

## 2024-07-03 RX ADMIN — SODIUM CHLORIDE, POTASSIUM CHLORIDE, SODIUM LACTATE AND CALCIUM CHLORIDE: 600; 310; 30; 20 INJECTION, SOLUTION INTRAVENOUS at 17:11

## 2024-07-03 RX ADMIN — INSULIN ASPART 1 UNITS: 100 INJECTION, SOLUTION INTRAVENOUS; SUBCUTANEOUS at 01:00

## 2024-07-03 RX ADMIN — INSULIN ASPART 1 UNITS: 100 INJECTION, SOLUTION INTRAVENOUS; SUBCUTANEOUS at 16:50

## 2024-07-03 RX ADMIN — Medication 12.5 MG: at 20:24

## 2024-07-03 ASSESSMENT — ACTIVITIES OF DAILY LIVING (ADL)
ADLS_ACUITY_SCORE: 22
ADLS_ACUITY_SCORE: 39
ADLS_ACUITY_SCORE: 22
ADLS_ACUITY_SCORE: 22
ADLS_ACUITY_SCORE: 39
ADLS_ACUITY_SCORE: 22
ADLS_ACUITY_SCORE: 39
HEARING_DIFFICULTY_OR_DEAF: NO
DOING_ERRANDS_INDEPENDENTLY_DIFFICULTY: NO
ADLS_ACUITY_SCORE: 39
ADLS_ACUITY_SCORE: 22
WALKING_OR_CLIMBING_STAIRS_DIFFICULTY: NO
DIFFICULTY_EATING/SWALLOWING: NO
ADLS_ACUITY_SCORE: 22
ADLS_ACUITY_SCORE: 22
WEAR_GLASSES_OR_BLIND: NO
ADLS_ACUITY_SCORE: 39
ADLS_ACUITY_SCORE: 22
CHANGE_IN_FUNCTIONAL_STATUS_SINCE_ONSET_OF_CURRENT_ILLNESS/INJURY: NO
ADLS_ACUITY_SCORE: 39
CONCENTRATING,_REMEMBERING_OR_MAKING_DECISIONS_DIFFICULTY: NO
DIFFICULTY_COMMUNICATING: NO
ADLS_ACUITY_SCORE: 22
DRESSING/BATHING_DIFFICULTY: NO
NUMBER_OF_TIMES_PATIENT_HAS_FALLEN_WITHIN_LAST_SIX_MONTHS: 1
TOILETING_ISSUES: NO
ADLS_ACUITY_SCORE: 22
FALL_HISTORY_WITHIN_LAST_SIX_MONTHS: YES
ADLS_ACUITY_SCORE: 39

## 2024-07-03 NOTE — PROGRESS NOTES
[facility], [place]   07/03/2024  Neurosurgery Progress Note:    Assessment:  74 year-old female with a notable PMHx of DM, ASA 81 mg (indication unclear), presenting with persistent headaches since she fell in [facility] on 6/16. Initial head CT negative. Found to have a small volume SAH along the right lateral temporal lobe on CT in the ED. No history of hypertension per family. Unremarkable neurological exam, she is more comfortable today with regards to her headaches and was able to cooperate better with the neurological exam. Repeat head CT on 7/2 evening was stable.      Plan:  -- No neurosurgical intervention indicated at this time   -- Plt > 100   -- INR < 1.5   -- SBP < 140  -- Continue to hold ASA   -- Neurosurgery will continue to follow   -----------------------------------  Eric Mora MD, PGY-1  Department of Neurosurgery  Pager: [phone]    Please contact neurosurgery resident on call with questions.    Dial * * [phone], enter [phone] when prompted.   -----------------------------------    Interval History: No acute events overnight. Ongoing medicine work-up. Stable head CT on 7/2 evening.     Objective:   Temp:  [97.7  F (36.5  C)-98.9  F (37.2  C)] 98.3  F (36.8  C)  Pulse:  [81-93] 81  Resp:  [16-23] 16  BP: (133-158)/(68-86) 144/86  SpO2:  [92 %-100 %] 99 %  I/O last 3 completed shifts:  In: 480 [P.O.:480]  Out: -     NEUROLOGIC: (Performed with daughter interpreting)   -- Awake; Alert; oriented x 3   -- Follows commands briskly  -- Speech fluent, spontaneous. No aphasia or dysarthria.  -- No gaze preference. No apparent hemineglect.     Cranial Nerves:  -- Extraocular movements intact  -- Face symmetrical, tongue midline  -- Sensory V1-V3 intact bilaterally  -- Palate elevates symmetrically, uvula midline  -- Hearing grossly intact bilat  -- Trapezii 5/5 strength bilat symmetric     Motor:  Normal bulk / tone; no tremor, rigidity, or bradykinesia.  No muscle wasting or  fasciculations  No Pronator Drift       Delt Bi Tri Hand Flexion/  Extension Iliopsoas Quadriceps Tibialis Anterior Gastroc     C5 C6 C7 C8/T1 L2 L3 L4 S1   R 5 5 5 5 5 5 5 5   L 5 5 5 5 5 5 5 5      Sensory:  intact to LT x 4 extremities     LABS:  Recent Labs   Lab 07/03/24  1151 07/03/24  0807 07/03/24  0747 07/02/24  2156 07/02/24  1124   NA  --  138  --   --  135   POTASSIUM  --  4.3  --   --  5.2   CHLORIDE  --  103  --   --  99   CO2  --  24  --   --  24   ANIONGAP  --  11  --   --  12   * 168* 156*   < > 217*   BUN  --  9.5  --   --  12.6   CR  --  0.60  --   --  0.60   NAYELI  --  8.9  --   --  9.2    < > = values in this interval not displayed.     Recent Labs   Lab 07/03/24  0807   WBC 5.0   RBC 3.86   HGB 11.7   HCT 35.1   MCV 91   MCH 30.3   MCHC 33.3   RDW 13.0        IMAGING:  Recent Results (from the past 24 hour(s))   CT Head w/o Contrast    Narrative    EXAM: CT HEAD W/O CONTRAST  7/2/2024 8:47 PM     HISTORY: subarachnoid hemorrhage with weeks of symptoms (fall on 6/16  suspected etiology), NS would like follow up at 6 hours from previous        COMPARISON: Head CT 7/2/2024    TECHNIQUE: Using multidetector thin collimation helical acquisition  technique, axial, coronal and sagittal CT images from the skull base  to the vertex were obtained without intravenous contrast.   (topogram) image(s) also obtained and reviewed.    FINDINGS:  Stable small volume extra-axial hemorrhage over the right lateral  temporal lobe. Hyperattenuation of the right tentorial leaflet (series  5, image 60) and along the left sagittal falx (series 5, image 41).      No acute loss of gray-white matter differentiation in the cerebral  hemispheres. Ventricles are proportionate to the cerebral sulci. Clear  basal cisterns. Periventricular and subcortical hypodensities which  could relate to chronic small vessel ischemic disease and senescent  changes.    The bony calvaria and the bones of the skull base are  normal. The  visualized portions of the paranasal sinuses and mastoid air cells are  clear. Grossly normal orbits.       Impression    IMPRESSION:   1.  Stable extra-axial hemorrhage along the right lateral temporal  lobe, previously described as subarachnoid, but possibly subdural.  2.  Stable right tentorial leaflets and sagittal falcine subdural  hemorrhage.     I have personally reviewed the examination and initial interpretation  and I agree with the findings.    NORMAN GOODRICH MD         SYSTEM ID:  [id number]

## 2024-07-03 NOTE — PROGRESS NOTES
"CLINICAL NUTRITION SERVICES - ASSESSMENT NOTE     Nutrition Prescription    RECOMMENDATIONS FOR MDs/PROVIDERS TO ORDER:  -none at present    Malnutrition Status:    Patient does not meet two of the established criteria necessary for diagnosing malnutrition    Recommendations already ordered by Registered Dietitian (RD):  - No intervention needs as patient eating well    Future/Additional Recommendations:  Will follow peripherally and rescreen in 14 days.      This RD working remotely d/t COVID.  Spoke with granddaughter Elton via phone re: pt's intake.     REASON FOR ASSESSMENT  Jf Schulz is a/an 74 year old female assessed by the dietitian for Provider Order - \"Decreased appetite and fluid intake\"    Per chart review, PMH of T2DM, CVA and PAF.  Admitted 7/2 w/ new finding of SAH.  Had an unwitnessed fall while traveling in [facility] with family.  She was found down with scalp laceration requiring stiches.  [place] the country on [place] and son brought her in d/t concerns for dizziness and FOLEY. (Son suspects fall was d/t low BG as pt didn't eat much to avoid having to go to the bathroom on the plane earlier that day.)    NUTRITION HISTORY  Per H&P, pt has been eating and drinking and able to feed herself.  Granddaughter Elton reports pt hasn't had any issues with poor intake prior to intake and is eating well now.     CURRENT NUTRITION ORDERS  Diet: Moderate Consistent Carbohydrate  Intake/Tolerance: Eating % of meals thus far per RN today.     LABS  Labs reviewed  A1c 10.1% 7/2 with Hgb 11.5.   (Suspect increased glucosuria and potential for dehydration).    MEDICATIONS  Medications reviewed    ANTHROPOMETRICS  Height: 5'7\"  Most Recent Weight: 70.5 kg (155 lb 6.4 oz)    IBW: 61.4 kg  (Pt @ 115% IBW)  BMI: Normal BMI  Weight History:   Wt Readings from Last 20 Encounters:   07/03/24 70.5 kg (155 lb 6.4 oz)   06/01/17 78.2 kg (172 lb 6.4 oz)   05/11/17 79.4 kg (175 lb 1.6 oz)     Wt assessment: No weights " "over past year.  Granddaughter Elton doesn't think she's lost weight.    Dosing Weight: 70.5 kg    ASSESSED NUTRITION NEEDS  Estimated Energy Needs: 1763 - 2115 kcals/day (25 - 30 kcals/kg)  Justification: Maintenance  Estimated Protein Needs: ~71 grams protein/day (~1 g Pro/kg)  Justification: Maintenance during hospitalization  Estimated Fluid Needs: 1763 - 2115 mL/day (1 mL/kcal)   Justification: Maintenance and Per provider pending fluid status    PHYSICAL FINDINGS  See malnutrition section below.    MALNUTRITION  % Intake: No decreased intake noted  % Weight Loss: None noted  Subcutaneous Fat Loss: Unable to assess as RD off site but not likely given above  Muscle Loss: Unable to assess  Fluid Accumulation/Edema: None noted per RN charting  Malnutrition Diagnosis: Patient does not meet two of the established criteria necessary for diagnosing malnutrition    NUTRITION DIAGNOSIS  No nutrition diagnosis at this time.    INTERVENTIONS  Implementation  Nutrition Education: Explained role of RD and reason for RD consult.  Granddaughter denies poor intake, maybe fluids.   No intervention needed.     Goals  Patient to consume % of nutritionally adequate meal trays TID, or the equivalent with supplements/snacks.     Monitoring/Evaluation  Progress toward goals will be monitored and evaluated per protocol.  Caterina Solis RD, LD   6 & 8 Med/Surg RD  Mon-Fri Vocera contact: By name, or \"6 [OR] 8 Med Surg Clinical Dietitian\"  Weekend RD Vocera: \"Weekend Holiday Clinical Dietitian\"        "

## 2024-07-03 NOTE — UTILIZATION REVIEW
"Admission Status; Secondary Review Determination     Under the authority of the Utilization Management Committee, the utilization review process indicated a secondary review on the above patient.  The review outcome is based on review of the medical records, discussions with staff, and applying clinical experience noted on the date of the review.          (x) Observation Status Appropriate - This patient does not meet hospital inpatient criteria and is placed in observation status. If this patient's primary payer is Medicare and was admitted as an inpatient, Condition Code 44 should be used and patient status changed to \"observation\".     RATIONALE FOR DETERMINATION   74-year-old [place] female with history of paroxysmal atrial fibrillation who was traveling in [facility] and fell on 6/16/2024.  Traveled back to [facility], and presents with headache.  Head CT showed small right subarachnoid hemorrhage.  Repeat imaging shows no worsening.  Pain is controlled with acetaminophen.  No surgical intervention is planned.  PT and OT evaluations pending.    The severity of illness, intensity of service provided, expected LOS and risk for adverse outcome make the care appropriate for further observation; however, doesn't meet criteria for hospital inpatient admission. Dr. Rivas notified of this determination.    This document was produced using voice recognition software.      The information on this document is developed by the utilization review team in order for the business office to ensure compliance.  This only denotes the appropriateness of proper admission status and does not reflect the quality of care rendered.         The definitions of Inpatient Status and Observation Status used in making the determination above are those provided in the CMS Coverage Manual, Chapter 1 and Chapter 6, section 70.4.      Sincerely,  Benita Stringer MD  Utilization Review  Physician Advisor  [facility].   "

## 2024-07-03 NOTE — PROGRESS NOTES
CP- and Candida auris screening information    This patient recieved healthcare outside of the [place] and [place] in the prior 12 months. and is potentially at a high risk for carrying CP- and/or Candida auris. The [place] Department of Health ([facility]) and CDC recommend that we test this patient to prevent the spread of these organisms within our healthcare facility. Testing is voluntary and includes collecting an axilla and groin swab for C. auris and a rectal swab for CP-. Due to the potential transmission of these organisms in healthcare settings, Infection Prevention requires that this patient be placed in a private room on Contact Precautions until testing is complete.       Please notify Infection Prevention if the patient declines testing.  Additional information and resources can be found on the Infection Prevention [employer] Sharepoint page.     7/3/2024  Nicole Downing, Infection Prevention

## 2024-07-03 NOTE — PLAN OF CARE
Goal Outcome Evaluation:      Plan of Care Reviewed With: patient    Overall Patient Progress: improving  VSS on RA. Pt is alert and oriented x4, will call appropriately. Up with A1 using walker and gait. L PIV infusing LR @ 50 ml/hr. Pt is continent of bowel and bladder, CT completed. Blood glucose check with sliding scale.  Family at bedside. No acute event noted this shift, call light within reach. Plan of care ongoing.

## 2024-07-03 NOTE — PROGRESS NOTES
6MS ADMISSION    D: Patient admitted/transferred from  ED via cart for continued observation.     I: Upon arrival to the unit patient was oriented to room, unit, and call light. Patient s height, weight, and vital signs were obtained. Allergies reviewed and allergy band applied. Provider notified of patient s arrival on the unit. Adult AVS completed. Head to toe assessment completed. Education assessment completed. Care plan initiated.    A: Vital signs stable upon admission. Patient rates pain at 3/10.  Bed Algorithm can be found in PCS flow sheets (Support Surface Algorithm) and on IP [facility] NURSE RESOURCE TAB, was this used during this assessment? N/A. Was a bariatric bed frame ordered? No. Was an air pump added to the Isoflex mattress? No    P: Continue to monitor patient s neuro assessment and intervene as needed. Continue with plan of care. Notify provider with any concerns or changes in patient status.

## 2024-07-03 NOTE — PHARMACY-ADMISSION MEDICATION HISTORY
Pharmacist Admission Medication History    Admission medication history is complete. The information provided in this note is only as accurate as the sources available at the time of the update.    Information Source(s): Family member via in-person    Pertinent Information: Patient's son brought in all the medication bottles here.   Alendronate 35 mg tablet: there was no bottle for it. However, it was last filled on 5/10/24, 4 tablet for 28 days.   Tolterodine ER 2 mg: there was no bottle for it either. It was last filled on 4/18/24, 90 tablets for 90 days.      Changes made to PTA medication list:  Added:Acetaminophen, Atorvastatin, Glipizide, Loperamide, Losartan, Metoprolol Succinate, Omeprazole and Sitagliptin-metformin   Deleted: Insulin Glargine, Metformin, Metoprolol tartrate, simvastatin, and vit D  Changed: None    Allergies reviewed with patient and updates made in EHR: yes    Medication History Completed By: Jonny Villegas RPH 7/2/2024 7:44 PM    PTA Med List   Medication Sig Last Dose    acetaminophen (TYLENOL) 500 MG tablet Take 500-1,000 mg by mouth every 8 hours as needed for mild pain Unknown    aspirin 81 MG EC tablet Take 81 mg by mouth daily 7/2/2024    atorvastatin (LIPITOR) 40 MG tablet Take 40 mg by mouth daily 7/2/2024    cetirizine (ZYRTEC) 10 MG tablet Take 10 mg by mouth daily 7/2/2024    glipiZIDE (GLUCOTROL) 10 MG tablet Take 10 mg by mouth 2 times daily (before meals) 7/2/2024    loperamide (IMODIUM A-D) 2 MG tablet Take 2 mg by mouth 4 times daily as needed for diarrhea Unknown    losartan (COZAAR) 25 MG tablet Take 25 mg by mouth daily 7/2/2024    metoprolol succinate ER (TOPROL XL) 25 MG 24 hr tablet Take 25 mg by mouth daily 7/2/2024    omeprazole (PRILOSEC) 20 MG DR capsule Take 20 mg by mouth daily 7/2/2024    sitagliptin-metFORMIN (JANUMET)  MG tablet Take 1 tablet by mouth 2 times daily (with meals) 7/2/2024

## 2024-07-03 NOTE — PROGRESS NOTES
VS: /80 (BP Location: Right arm)   Pulse 87   Temp 98.9  F (37.2  C) (Oral)   Resp 16   Wt 70.5 kg (155 lb 6.4 oz)   SpO2 100%   BMI 24.33 kg/m     O2: Stable on RA, no SOB   Output: Continent of bowel and bladder       Activity: SBA   Skin:    Pain: Managed with PRN pain medication              CMS: A&O x4. No N/V or numbness/tingling reported   Dressing: N/A   Diet: Moderate Consistent Carb   LDA: L PIV infusing LR 50 ml/hr   Equipment: IV pole, personal belongings   Plan: Call light in reach, continue POC   Additional Info:

## 2024-07-03 NOTE — PROGRESS NOTES
[facility]    Medicine Progress Note - Hospitalist Service, GOLD TEAM 16    Date of Admission:  7/2/2024    Assessment & Plan    Jf Schulz is a 74 year old female admitted on 7/2/2024 with new finding of subarachnoid hemorrhage. She has a history of T2DM, CVA, paroxysmal atrial fibrillation.     # Traumatic R temporal SAH   # Severe right sided headache   #Scalp laceration  -- Sustained a ground level fall in her travels   -- CT reportedly unremarkable during time of fall   -- developed severe headache 3 days following fall.  -- CT shows   - 7/2 14:35  (1. Small volume subarachnoid hemorrhage along the posterior right  temporal convexity. No other intracranial hemorrhage.  2. Diffuse cerebral volume loss and cerebral white matter changes  consistent with chronic small vessel ischemic disease. [Critical  Result: Intracranial hemorrhage])     - 7/2  20:47  1.  Stable extra-axial hemorrhage along the right lateral temporal  lobe, previously described as subarachnoid, but possibly subdural.  2.  Stable right tentorial leaflets and sagittal falcine subdural  hemorrhage.     -- Patient has continued right sided headache which is managed with tylenol.  -- NSG consulted with the following recs (7/2)  RECOMMENDATIONS:  -- No neurosurgical intervention indicated at this time   -- Agree with admission to the Medicine service   -- Repeat head CT 6 hours from the initial scan   -- Plt > 100   -- INR < 1.5   -- SBP < 140  -- Continue to hold ASA   -- Neurosurgery will continue to follow   -- Await further recs, continue to monitor  -- PT/OT eval     Chronic Medical conditions     # Paroxysmal a fib  Diagnosed in 2017. Did not start AC as she was to have a surgery soon. During that surgical admission, she developed a MCA stroke as below. She was placed on warfarin for a few months before this was stopped as it was felt she had remained in sinus rhythm. She follows with cards with  recent ziopatch. No recent CP, sob. Dizziness noted.   - cardiac monitoring   - cont PTA metoprolol with holding parameters  - mechanical DVT ppx     # T2DM  A1c 2021 was 7%. Has seen endocrinology in the past. At baseline she is on Janumet (sitagliptin + metformin) and glipizide. No insulin.   - Hold PTA Janumet, sitagliptin, metformin as c/f diminished po intake  - LSSI  - BG Q4H   - hypoglycemia protocol  Recent Labs   Lab 07/03/24  1151 07/03/24  0807 07/03/24  0747 07/03/24  0617 07/03/24  0224 07/03/24  0059   * 168* 156* 160* 151* 232*          # Right hip and upper leg pain  She states she hit this while using the toilet. Has been able to weight bear and usually with walking it feels better.   - pain regimen as above     # Mild normocytic anemia, acute  Possibly r/t acute bleeding  - trend     # MCA CVA (2017)  No baseline deficits.   - hold PTA aspirin 81 mg daily     # HTN  Goal to keep SBP <140 mmHg  - PTA losartan 25 mg daily with hold parameters  - PTA metoprolol 12.5 mg BID with hold parameters (sub for succinate 25 mg daily)  - labetalol 10 mg Q4H PRN for SBP >160     # Dyslipidemia - cont PTA statin (once med rec complete)  # Hypomagnesemia   # Vit D deficiency               Diet: Moderate Consistent Carb (60 g CHO per Meal) Diet    DVT Prophylaxis: Pneumatic Compression Devices  Sullivan Catheter: Not present  Lines: None     Cardiac Monitoring: ACTIVE order. Indication: Syncope  Code Status: Full Code      Clinically Significant Risk Factors Present on Admission              # Hypoalbuminemia: Lowest albumin = 3.4 g/dL at 7/2/2024 11:24 AM, will monitor as appropriate   # Drug Induced Platelet Defect: home medication list includes an antiplatelet medication   # Hypertension: Home medication list includes antihypertensive(s)            # DMII: A1C = 10.1 % (Ref range: <5.7 %) within past 6 months               Disposition Plan     Medically Ready for Discharge: Anticipated in 2-4 Days              Driss Rivas MD  Hospitalist Service, GOLD TEAM 16  [facility]  Securely message with [facility] (more info)  Text page via [employer] Paging/Directory   See signed in provider for up to date coverage information  ______________________________________________________________________    Interval History   Patient seen in rounds this morning. Family at bedside who engages in encounter.   Patient complains of severe right side headache that has been waxing and waning.  Denies associated symptoms including blurred vision, lightheadedness or nausea.   Spoke to patients in her native language.     Physical Exam   Vital Signs: Temp: 98.9  F (37.2  C) Temp src: Oral BP: 139/80 Pulse: 87   Resp: 16 SpO2: 100 % O2 Device: None (Room air)    Weight: 155 lbs 6.4 oz    General Appearance: Appears comfortable.  Respiratory: Without wheezes rhonchi or rales.  CTA  Cardiovascular: RRR, without murmurs  GI: Soft, nontender, plus BS  Skin: Without obvious bleeding, bruising or excoriations      Medical Decision Making       59 MINUTES SPENT BY ME on the date of service doing chart review, history, exam, documentation & further activities per the note.      Data   ------------------------- PAST 24 HR DATA REVIEWED -----------------------------------------------

## 2024-07-04 LAB
BASOPHILS # BLD AUTO: 0 10E3/UL (ref 0–0.2)
BASOPHILS NFR BLD AUTO: 1 %
EOSINOPHIL # BLD AUTO: 0.1 10E3/UL (ref 0–0.7)
EOSINOPHIL NFR BLD AUTO: 2 %
ERYTHROCYTE [DISTWIDTH] IN BLOOD BY AUTOMATED COUNT: 12.8 % (ref 10–15)
GLUCOSE BLDC GLUCOMTR-MCNC: 181 MG/DL (ref 70–99)
GLUCOSE BLDC GLUCOMTR-MCNC: 221 MG/DL (ref 70–99)
GLUCOSE BLDC GLUCOMTR-MCNC: 230 MG/DL (ref 70–99)
GLUCOSE BLDC GLUCOMTR-MCNC: 241 MG/DL (ref 70–99)
GLUCOSE BLDC GLUCOMTR-MCNC: 273 MG/DL (ref 70–99)
GLUCOSE BLDC GLUCOMTR-MCNC: 335 MG/DL (ref 70–99)
HCT VFR BLD AUTO: 36.4 % (ref 35–47)
HGB BLD-MCNC: 11.9 G/DL (ref 11.7–15.7)
HOLD SPECIMEN: NORMAL
IMM GRANULOCYTES # BLD: 0 10E3/UL
IMM GRANULOCYTES NFR BLD: 0 %
INR PPP: 1.01 (ref 0.85–1.15)
LYMPHOCYTES # BLD AUTO: 1.2 10E3/UL (ref 0.8–5.3)
LYMPHOCYTES NFR BLD AUTO: 27 %
MCH RBC QN AUTO: 30 PG (ref 26.5–33)
MCHC RBC AUTO-ENTMCNC: 32.7 G/DL (ref 31.5–36.5)
MCV RBC AUTO: 92 FL (ref 78–100)
MONOCYTES # BLD AUTO: 0.4 10E3/UL (ref 0–1.3)
MONOCYTES NFR BLD AUTO: 9 %
NEUTROPHILS # BLD AUTO: 2.8 10E3/UL (ref 1.6–8.3)
NEUTROPHILS NFR BLD AUTO: 61 %
NRBC # BLD AUTO: 0 10E3/UL
NRBC BLD AUTO-RTO: 0 /100
PLATELET # BLD AUTO: 246 10E3/UL (ref 150–450)
RBC # BLD AUTO: 3.97 10E6/UL (ref 3.8–5.2)
WBC # BLD AUTO: 4.5 10E3/UL (ref 4–11)

## 2024-07-04 PROCEDURE — 999N000147 HC STATISTIC PT IP EVAL DEFER

## 2024-07-04 PROCEDURE — 999N000111 HC STATISTIC OT IP EVAL DEFER

## 2024-07-04 PROCEDURE — 85048 AUTOMATED LEUKOCYTE COUNT: CPT | Performed by: STUDENT IN AN ORGANIZED HEALTH CARE EDUCATION/TRAINING PROGRAM

## 2024-07-04 PROCEDURE — 85610 PROTHROMBIN TIME: CPT | Performed by: STUDENT IN AN ORGANIZED HEALTH CARE EDUCATION/TRAINING PROGRAM

## 2024-07-04 PROCEDURE — G0378 HOSPITAL OBSERVATION PER HR: HCPCS

## 2024-07-04 PROCEDURE — 82962 GLUCOSE BLOOD TEST: CPT

## 2024-07-04 PROCEDURE — 99233 SBSQ HOSP IP/OBS HIGH 50: CPT | Performed by: STUDENT IN AN ORGANIZED HEALTH CARE EDUCATION/TRAINING PROGRAM

## 2024-07-04 PROCEDURE — 250N000013 HC RX MED GY IP 250 OP 250 PS 637: Performed by: PHYSICIAN ASSISTANT

## 2024-07-04 PROCEDURE — 36415 COLL VENOUS BLD VENIPUNCTURE: CPT | Performed by: STUDENT IN AN ORGANIZED HEALTH CARE EDUCATION/TRAINING PROGRAM

## 2024-07-04 PROCEDURE — 250N000013 HC RX MED GY IP 250 OP 250 PS 637: Performed by: STUDENT IN AN ORGANIZED HEALTH CARE EDUCATION/TRAINING PROGRAM

## 2024-07-04 RX ORDER — GABAPENTIN 100 MG/1
100 CAPSULE ORAL 3 TIMES DAILY
Status: COMPLETED | OUTPATIENT
Start: 2024-07-04 | End: 2024-07-04

## 2024-07-04 RX ADMIN — GABAPENTIN 100 MG: 100 CAPSULE ORAL at 14:47

## 2024-07-04 RX ADMIN — INSULIN ASPART 1 UNITS: 100 INJECTION, SOLUTION INTRAVENOUS; SUBCUTANEOUS at 04:26

## 2024-07-04 RX ADMIN — LOSARTAN POTASSIUM 25 MG: 25 TABLET, FILM COATED ORAL at 08:11

## 2024-07-04 RX ADMIN — INSULIN ASPART 2 UNITS: 100 INJECTION, SOLUTION INTRAVENOUS; SUBCUTANEOUS at 16:10

## 2024-07-04 RX ADMIN — GABAPENTIN 100 MG: 100 CAPSULE ORAL at 20:35

## 2024-07-04 RX ADMIN — GABAPENTIN 100 MG: 100 CAPSULE ORAL at 10:43

## 2024-07-04 RX ADMIN — ACETAMINOPHEN 650 MG: 325 TABLET, FILM COATED ORAL at 08:45

## 2024-07-04 RX ADMIN — INSULIN ASPART 2 UNITS: 100 INJECTION, SOLUTION INTRAVENOUS; SUBCUTANEOUS at 12:59

## 2024-07-04 RX ADMIN — INSULIN ASPART 2 UNITS: 100 INJECTION, SOLUTION INTRAVENOUS; SUBCUTANEOUS at 20:34

## 2024-07-04 RX ADMIN — PANTOPRAZOLE SODIUM 40 MG: 40 TABLET, DELAYED RELEASE ORAL at 08:10

## 2024-07-04 RX ADMIN — INSULIN ASPART 1 UNITS: 100 INJECTION, SOLUTION INTRAVENOUS; SUBCUTANEOUS at 08:11

## 2024-07-04 RX ADMIN — Medication 12.5 MG: at 20:35

## 2024-07-04 RX ADMIN — INSULIN ASPART 1 UNITS: 100 INJECTION, SOLUTION INTRAVENOUS; SUBCUTANEOUS at 01:22

## 2024-07-04 RX ADMIN — Medication 12.5 MG: at 08:10

## 2024-07-04 RX ADMIN — ATORVASTATIN CALCIUM 40 MG: 40 TABLET, FILM COATED ORAL at 08:11

## 2024-07-04 ASSESSMENT — ACTIVITIES OF DAILY LIVING (ADL)
ADLS_ACUITY_SCORE: 28
ADLS_ACUITY_SCORE: 22
ADLS_ACUITY_SCORE: 28
ADLS_ACUITY_SCORE: 22
ADLS_ACUITY_SCORE: 28
ADLS_ACUITY_SCORE: 28
ADLS_ACUITY_SCORE: 22
ADLS_ACUITY_SCORE: 28

## 2024-07-04 NOTE — PROGRESS NOTES
[facility], [place]   07/04/2024  Neurosurgery Progress Note:    Assessment:  74 year-old female with a notable PMHx of DM, ASA 81 mg (indication unclear), presenting with persistent headaches since she fell in [facility] on 6/16. Initial head CT negative. Found to have a small volume SAH along the right lateral temporal lobe on CT in the ED. No history of hypertension per family. Unremarkable neurological exam, she is more comfortable today with regards to her headaches and was able to cooperate better with the neurological exam. Repeat head CT on 7/2 evening was stable.      Plan:  -- No neurosurgical intervention indicated at this time   -- Plt > 100   -- INR < 1.5   -- SBP < 140  -- Continue to hold ASA   -- Follow up plan:   - 2 week CT Head with FLORA visit at that time (ordered and scheduled for you)  -- Neurosurgery will peripherally follow the patient  -----------------------------------  Geovani Childers MD  PGY-2 Department of Neurosurgery  [facility]  Pager: [phone]  On-call: [phone]   -----------------------------------    Interval History: No acute events overnight per chart review.      Objective:   Temp:  [97.9  F (36.6  C)-98.3  F (36.8  C)] 97.9  F (36.6  C)  Pulse:  [81] 81  BP: (122-152)/(73-86) 122/83  SpO2:  [99 %-100 %] 100 %  I/O last 3 completed shifts:  In: 480 [P.O.:480]  Out: -     NEUROLOGIC: (Performed with daughter interpreting) (Exam on 7/3/2024)  -- Awake; Alert; oriented x 3   -- Follows commands briskly  -- Speech fluent, spontaneous. No aphasia or dysarthria.  -- No gaze preference. No apparent hemineglect.     Cranial Nerves:  -- Extraocular movements intact  -- Face symmetrical, tongue midline  -- Sensory V1-V3 intact bilaterally  -- Palate elevates symmetrically, uvula midline  -- Hearing grossly intact bilat  -- Trapezii 5/5 strength bilat symmetric     Motor:  Normal bulk / tone; no tremor, rigidity, or bradykinesia.  No  muscle wasting or fasciculations  No Pronator Drift       Delt Bi Tri Hand Flexion/  Extension Iliopsoas Quadriceps Tibialis Anterior Gastroc     C5 C6 C7 C8/T1 L2 L3 L4 S1   R 5 5 5 5 5 5 5 5   L 5 5 5 5 5 5 5 5      Sensory:  intact to LT x 4 extremities     LABS:  Recent Labs   Lab 07/04/24  1252 07/04/24  0804 07/04/24  0421 07/03/24  1151 07/03/24  0807 07/02/24  2156 07/02/24  1124   NA  --   --   --   --  138  --  135   POTASSIUM  --   --   --   --  4.3  --  5.2   CHLORIDE  --   --   --   --  103  --  99   CO2  --   --   --   --  24  --  24   ANIONGAP  --   --   --   --  11  --  12   * 181* 221*   < > 168*   < > 217*   BUN  --   --   --   --  9.5  --  12.6   CR  --   --   --   --  0.60  --  0.60   NAYELI  --   --   --   --  8.9  --  9.2    < > = values in this interval not displayed.     Recent Labs   Lab 07/04/24  0925   WBC 4.5   RBC 3.97   HGB 11.9   HCT 36.4   MCV 92   MCH 30.0   MCHC 32.7   RDW 12.8        IMAGING:  No results found for this or any previous visit (from the past 24 hour(s)).

## 2024-07-04 NOTE — PLAN OF CARE
Goal Outcome Evaluation:      Plan of Care Reviewed With: patient    Overall Patient Progress: improvingOverall Patient Progress: improving     Shift: 4719-2038    VS: /83 (BP Location: Right arm)   Pulse 81   Temp 97.9  F (36.6  C) (Oral)   Resp 16   Wt 71.9 kg (158 lb 8.2 oz)   SpO2 100%   BMI 24.82 kg/m      Pain:  Pt has mild headache, prn tylenol effective  Neuro: A&Ox4, Indonesian speaking, family at bedside  Resp: WDL   Diet: Reg diet 60g cho  Skin: No new concerns  LDA: R PIV infusing LR @50/hr  Activity: SBA with family   Output: Voids adequately without difficulty    Call light within reach     Plan of care ongoing

## 2024-07-04 NOTE — PLAN OF CARE
Physical Therapy: Orders received. Chart reviewed and discussed with care team.? Physical Therapy not indicated due to pt has been up and mobilizing w/ family including amb in hallway w/out use of assistive device. Pt feels that she is at baseline and declines the need for IP PT, home PT, or OP PT. Pt has no concerns and per visitor she has good family support at home and they feel comfortable managing.? Defer discharge recommendations to medical team.? Will complete orders.

## 2024-07-04 NOTE — PLAN OF CARE
Goal Outcome Evaluation:         VS: Blood pressure 125/73, pulse 81, temperature 98  F (36.7  C), temperature source Oral, resp. rate 16, weight 70.5 kg (155 lb 6.4 oz), SpO2 100%.    O2: RA   Output: Continent of bowel and bladder   Last BM:    Activity: Asst 1 w/ gait+walker   Skin: intact   Pain: tylenol   CMS: A&O   Dressing:    Diet: Reg/Thin/Whole   LDA: R PIV   Equipment: Call light, IV Pole, personal belongings   Plan: Cont'd POC   Additional Info: Pt slept through most of the night, able to make needs known, no acute changes ON.

## 2024-07-04 NOTE — PROGRESS NOTES
[facility]    Medicine Progress Note - Hospitalist Service, GOLD TEAM 16    Date of Admission:  7/2/2024    Assessment & Plan    Jf Schulz is a 74 year old female admitted on 7/2/2024 with new finding of subarachnoid hemorrhage. She has a history of T2DM, CVA, paroxysmal atrial fibrillation.     New Today 7/4  -- Physical therapy eval requested  -- MRI for continued right lower back  -- persistent headache.     # Traumatic R temporal SAH   # Severe right sided headache   #Scalp laceration  -- Sustained a ground level fall in her travels   -- CT reportedly unremarkable during time of fall   -- developed severe headache 3 days following fall.  -- CT shows   - 7/2 14:35  (1. Small volume subarachnoid hemorrhage along the posterior right  temporal convexity. No other intracranial hemorrhage.  2. Diffuse cerebral volume loss and cerebral white matter changes  consistent with chronic small vessel ischemic disease. [Critical  Result: Intracranial hemorrhage])     - 7/2  20:47  1.  Stable extra-axial hemorrhage along the right lateral temporal  lobe, previously described as subarachnoid, but possibly subdural.  2.  Stable right tentorial leaflets and sagittal falcine subdural  hemorrhage.     -- Patient has continued right sided headache which is managed with tylenol.  -- NSG consulted with the following recs (7/2)  RECOMMENDATIONS:  -- No neurosurgical intervention indicated at this time   -- Agree with admission to the Medicine service   -- Repeat head CT 6 hours from the initial scan   -- Plt > 100   -- INR < 1.5   -- SBP < 140  -- Continue to hold ASA   -- Neurosurgery will continue to follow   -- Await further recs, continue to monitor  -- PT/OT eval     #Lower back pain  #Right thigh pain  #Sciatica ?  -- Patient had a fall aprx 20 days prior and since has had right leg pain with shooting sensation  -- denies loss of sensation  -- MRI ordered and pending  -- Trial of  gabapentin      Chronic Medical conditions     # Paroxysmal a fib  Diagnosed in 2017. Did not start AC as she was to have a surgery soon. During that surgical admission, she developed a MCA stroke as below. She was placed on warfarin for a few months before this was stopped as it was felt she had remained in sinus rhythm. She follows with cards with recent ziopatch. No recent CP, sob. Dizziness noted.   - cardiac monitoring   - cont PTA metoprolol with holding parameters  - mechanical DVT ppx     # T2DM  A1c 2021 was 7%. Has seen endocrinology in the past. At baseline she is on Janumet (sitagliptin + metformin) and glipizide. No insulin.   - Hold PTA Janumet, sitagliptin, metformin as c/f diminished po intake  - LSSI  - BG Q4H   - hypoglycemia protocol  Recent Labs   Lab 07/04/24  0804 07/04/24  0421 07/04/24  0113 07/03/24  2036 07/03/24  1851 07/03/24  1649   * 221* 230* 334* 234* 222*          # Right hip and upper leg pain  She states she hit this while using the toilet. Has been able to weight bear and usually with walking it feels better.   - pain regimen as above     # Mild normocytic anemia, acute  Possibly r/t acute bleeding  - trend     # MCA CVA (2017)  No baseline deficits.   - hold PTA aspirin 81 mg daily     # HTN  Goal to keep SBP <140 mmHg  - PTA losartan 25 mg daily with hold parameters  - PTA metoprolol 12.5 mg BID with hold parameters (sub for succinate 25 mg daily)  - labetalol 10 mg Q4H PRN for SBP >160     # Dyslipidemia - cont PTA statin (once med rec complete)  # Hypomagnesemia   # Vit D deficiency               Diet: Moderate Consistent Carb (60 g CHO per Meal) Diet    DVT Prophylaxis: Pneumatic Compression Devices  Sullivan Catheter: Not present  Lines: None     Cardiac Monitoring: ACTIVE order. Indication: Syncope  Code Status: Full Code      Clinically Significant Risk Factors              # Hypoalbuminemia: Lowest albumin = 3.4 g/dL at 7/2/2024 11:24 AM, will monitor as appropriate                    # DMII: A1C = 10.1 % (Ref range: <5.7 %) within past 6 months, PRESENT ON ADMISSION             Disposition Plan     Medically Ready for Discharge: Anticipated in 2-4 Days             Driss Rivas MD  Hospitalist Service, GOLD TEAM 16  [facility]  Securely message with XIPWIRE (more info)  Text page via [employer] Paging/Directory   See signed in provider for up to date coverage information  ______________________________________________________________________    Interval History   Patient seen and examined. Patient son currently at bedside. Patient states she still has right sided headache, and reports eye pain which is waxing and waning. Denies any change in vision. She also endorses right leg pain, and states has a shooting pain. She thinks it started following a fall about 20 days prior.    Physical Exam   Vital Signs: Temp: 97.9  F (36.6  C) Temp src: Oral BP: 122/83 Pulse: 81     SpO2: 100 % O2 Device: None (Room air)    Weight: 158 lbs 8.17 oz    General Appearance: Appears comfortable.  Respiratory: Without wheezes rhonchi or rales.  CTA  Cardiovascular: RRR, without murmurs  GI: Soft, nontender, plus BS  Skin: Without obvious bleeding, bruising or excoriations      Medical Decision Making       59 MINUTES SPENT BY ME on the date of service doing chart review, history, exam, documentation & further activities per the note.      Data   ------------------------- PAST 24 HR DATA REVIEWED -----------------------------------------------

## 2024-07-04 NOTE — PLAN OF CARE
Occupational Therapy: Orders received. Chart reviewed and discussed with care team.? Occupational Therapy not indicated as patient refused PT eval x2.  Per RN and pt, she is up ambulating hallways with family, and pt with good family support at home.? Defer discharge recommendations to medical team.? Will complete orders.

## 2024-07-04 NOTE — PLAN OF CARE
Pt is alert and oriented  x 4.  Able to make needs known. Calm and cooperative. Denies SOB, CP, and n/v.  Pt is on RA.  A 1x with walker and g/b.  Continent of bowel and bladder. L PIV infusing LR @ 50 ml/hr.  Family member at bedside helping with cares. No acute changes during this shift. Call light within reach. Continue POC.

## 2024-07-05 VITALS
SYSTOLIC BLOOD PRESSURE: 135 MMHG | WEIGHT: 164.68 LBS | DIASTOLIC BLOOD PRESSURE: 75 MMHG | RESPIRATION RATE: 20 BRPM | OXYGEN SATURATION: 99 % | BODY MASS INDEX: 25.79 KG/M2 | TEMPERATURE: 98 F | HEART RATE: 79 BPM

## 2024-07-05 LAB
BASOPHILS # BLD AUTO: 0 10E3/UL (ref 0–0.2)
BASOPHILS NFR BLD AUTO: 1 %
EOSINOPHIL # BLD AUTO: 0.1 10E3/UL (ref 0–0.7)
EOSINOPHIL NFR BLD AUTO: 3 %
ERYTHROCYTE [DISTWIDTH] IN BLOOD BY AUTOMATED COUNT: 12.9 % (ref 10–15)
GLUCOSE BLDC GLUCOMTR-MCNC: 146 MG/DL (ref 70–99)
GLUCOSE BLDC GLUCOMTR-MCNC: 236 MG/DL (ref 70–99)
GLUCOSE BLDC GLUCOMTR-MCNC: 258 MG/DL (ref 70–99)
GLUCOSE BLDC GLUCOMTR-MCNC: 377 MG/DL (ref 70–99)
HCT VFR BLD AUTO: 33.5 % (ref 35–47)
HGB BLD-MCNC: 11 G/DL (ref 11.7–15.7)
HOLD SPECIMEN: NORMAL
IMM GRANULOCYTES # BLD: 0 10E3/UL
IMM GRANULOCYTES NFR BLD: 0 %
INR PPP: 1.12 (ref 0.85–1.15)
LYMPHOCYTES # BLD AUTO: 1.4 10E3/UL (ref 0.8–5.3)
LYMPHOCYTES NFR BLD AUTO: 35 %
MCH RBC QN AUTO: 30.1 PG (ref 26.5–33)
MCHC RBC AUTO-ENTMCNC: 32.8 G/DL (ref 31.5–36.5)
MCV RBC AUTO: 92 FL (ref 78–100)
MONOCYTES # BLD AUTO: 0.5 10E3/UL (ref 0–1.3)
MONOCYTES NFR BLD AUTO: 12 %
NEUTROPHILS # BLD AUTO: 2 10E3/UL (ref 1.6–8.3)
NEUTROPHILS NFR BLD AUTO: 49 %
NRBC # BLD AUTO: 0 10E3/UL
NRBC BLD AUTO-RTO: 0 /100
PLATELET # BLD AUTO: 204 10E3/UL (ref 150–450)
RBC # BLD AUTO: 3.66 10E6/UL (ref 3.8–5.2)
WBC # BLD AUTO: 4.1 10E3/UL (ref 4–11)

## 2024-07-05 PROCEDURE — 85610 PROTHROMBIN TIME: CPT | Performed by: STUDENT IN AN ORGANIZED HEALTH CARE EDUCATION/TRAINING PROGRAM

## 2024-07-05 PROCEDURE — 85025 COMPLETE CBC W/AUTO DIFF WBC: CPT | Performed by: STUDENT IN AN ORGANIZED HEALTH CARE EDUCATION/TRAINING PROGRAM

## 2024-07-05 PROCEDURE — 82962 GLUCOSE BLOOD TEST: CPT

## 2024-07-05 PROCEDURE — 36415 COLL VENOUS BLD VENIPUNCTURE: CPT | Performed by: STUDENT IN AN ORGANIZED HEALTH CARE EDUCATION/TRAINING PROGRAM

## 2024-07-05 PROCEDURE — 99239 HOSP IP/OBS DSCHRG MGMT >30: CPT | Performed by: STUDENT IN AN ORGANIZED HEALTH CARE EDUCATION/TRAINING PROGRAM

## 2024-07-05 PROCEDURE — 250N000013 HC RX MED GY IP 250 OP 250 PS 637: Performed by: PHYSICIAN ASSISTANT

## 2024-07-05 PROCEDURE — G0378 HOSPITAL OBSERVATION PER HR: HCPCS

## 2024-07-05 RX ORDER — ACETAMINOPHEN 325 MG/1
650 TABLET ORAL EVERY 4 HOURS PRN
Qty: 30 TABLET | Refills: 0 | Status: SHIPPED | OUTPATIENT
Start: 2024-07-05

## 2024-07-05 RX ORDER — METOPROLOL TARTRATE 25 MG/1
12.5 TABLET, FILM COATED ORAL 2 TIMES DAILY
Qty: 30 TABLET | Refills: 0 | Status: SHIPPED | OUTPATIENT
Start: 2024-07-05

## 2024-07-05 RX ORDER — LANCETS
EACH MISCELLANEOUS
Qty: 100 EACH | Refills: 6 | Status: SHIPPED | OUTPATIENT
Start: 2024-07-05

## 2024-07-05 RX ADMIN — INSULIN ASPART 2 UNITS: 100 INJECTION, SOLUTION INTRAVENOUS; SUBCUTANEOUS at 04:20

## 2024-07-05 RX ADMIN — PANTOPRAZOLE SODIUM 40 MG: 40 TABLET, DELAYED RELEASE ORAL at 07:39

## 2024-07-05 RX ADMIN — ACETAMINOPHEN 650 MG: 325 TABLET, FILM COATED ORAL at 07:58

## 2024-07-05 RX ADMIN — INSULIN ASPART 1 UNITS: 100 INJECTION, SOLUTION INTRAVENOUS; SUBCUTANEOUS at 07:39

## 2024-07-05 RX ADMIN — INSULIN ASPART 1 UNITS: 100 INJECTION, SOLUTION INTRAVENOUS; SUBCUTANEOUS at 12:16

## 2024-07-05 RX ADMIN — INSULIN ASPART 3 UNITS: 100 INJECTION, SOLUTION INTRAVENOUS; SUBCUTANEOUS at 00:16

## 2024-07-05 RX ADMIN — Medication 12.5 MG: at 07:39

## 2024-07-05 RX ADMIN — ATORVASTATIN CALCIUM 40 MG: 40 TABLET, FILM COATED ORAL at 07:39

## 2024-07-05 RX ADMIN — LOSARTAN POTASSIUM 25 MG: 25 TABLET, FILM COATED ORAL at 07:39

## 2024-07-05 ASSESSMENT — ACTIVITIES OF DAILY LIVING (ADL)
ADLS_ACUITY_SCORE: 28

## 2024-07-05 NOTE — DISCHARGE SUMMARY
"[facility]  Hospitalist Discharge Summary      Date of Admission:  7/2/2024  Date of Discharge:  7/5/2024  Discharging Provider: Driss Rivas MD  Discharge Service: Hospitalist Service, GOLD TEAM 16    Discharge Diagnoses   # Traumatic R temporal SAH   # Severe right sided headache   #Scalp laceration  #Lower back pain  #Right thigh pain  #Sciatica   Clinically Significant Risk Factors     # DMII: A1C = 10.1 % (Ref range: <5.7 %) within past 6 months       Follow-ups Needed After Discharge       Unresulted Labs Ordered in the Past 30 Days of this Admission       Date and Time Order Name Status Description    7/3/2024 12:59 PM Candida auris by PCR In process             Discharge Disposition   Discharged to home  Condition at discharge: Stable    Hospital Course   Jf Schulz is a 74 year old female admitted on 7/2/2024 with new finding of subarachnoid hemorrhage. She has a history of T2DM, CVA, paroxysmal atrial fibrillation.     Patient hospitalization was unremarkable, NSG was consulted and no recommendations for surgical intervention. Patient had repeat CT head which showed stable status of SAH. Patients blood pressure was monitored without notable changes. She had ongoing headache of the right side which was managed with oral Tylenol. Per Neurosurgery patient will require CT head two weeks from now (ordered). Additionally, patient on day of discharge complain of small \"bump\" on her head which has been there for 2-3 years and this is causing her pain. On evaluation it appears as acne or tan colored <1cm raised lesion. I did refer her to outpatient workup with derm. Patient and son agreeable to discharge home and follow up with neurosurgery. All questions answered to their satisfaction.     # Traumatic R temporal SAH   # Severe right sided headache   #Scalp laceration  -- Sustained a ground level fall in her travels   -- CT reportedly unremarkable during time of " fall   -- developed severe headache 3 days following fall.  -- CT shows   - 7/2 14:35  (1. Small volume subarachnoid hemorrhage along the posterior right  temporal convexity. No other intracranial hemorrhage.  2. Diffuse cerebral volume loss and cerebral white matter changes  consistent with chronic small vessel ischemic disease. [Critical  Result: Intracranial hemorrhage])     - 7/2  20:47  1.  Stable extra-axial hemorrhage along the right lateral temporal  lobe, previously described as subarachnoid, but possibly subdural.  2.  Stable right tentorial leaflets and sagittal falcine subdural  hemorrhage.     -- Patient has continued right sided headache which is managed with tylenol.  -- NSG consulted with the following recs (7/2)  RECOMMENDATIONS:  -- No neurosurgical intervention indicated at this time   -- Agree with admission to the Medicine service   -- Repeat head CT 6 hours from the initial scan   -- Plt > 100   -- INR < 1.5   -- SBP < 140  -- Continue to hold ASA   -- Neurosurgery will continue to follow   -- Await further recs, continue to monitor  -- PT/OT eval     #Lower back pain  #Right thigh pain  #Sciatica   -- Patient had a fall aprx 20 days prior and since has had right leg pain with shooting sensation  -- denies loss of sensation  -- MRI ordered and pending  -- Trial of gabapentin      Chronic Medical conditions     # Paroxysmal a fib  Diagnosed in 2017. Did not start AC as she was to have a surgery soon. During that surgical admission, she developed a MCA stroke as below. She was placed on warfarin for a few months before this was stopped as it was felt she had remained in sinus rhythm. She follows with cards with recent ziopatch. No recent CP, sob. Dizziness noted.   - cardiac monitoring   - cont PTA metoprolol with holding parameters  - mechanical DVT ppx     # T2DM  A1c 2021 was 7%. Has seen endocrinology in the past. At baseline she is on Janumet (sitagliptin + metformin) and glipizide. No  insulin.   - Hold PTA Janumet, sitagliptin, metformin as c/f diminished po intake  - LSSI  - BG Q4H   - hypoglycemia protocol  Recent Labs   Lab 07/04/24  0804 07/04/24  0421 07/04/24  0113 07/03/24  2036 07/03/24  1851 07/03/24  1649   * 221* 230* 334* 234* 222*          # Right hip and upper leg pain  She states she hit this while using the toilet. Has been able to weight bear and usually with walking it feels better.   - pain regimen as above     # Mild normocytic anemia, acute  Possibly r/t acute bleeding  - trend     # MCA CVA (2017)  No baseline deficits.   - hold PTA aspirin 81 mg daily     # HTN  Goal to keep SBP <140 mmHg  - PTA losartan 25 mg daily with hold parameters  - PTA metoprolol 12.5 mg BID with hold parameters (sub for succinate 25 mg daily)  - labetalol 10 mg Q4H PRN for SBP >160     # Dyslipidemia - cont PTA statin (once med rec complete)  # Hypomagnesemia   # Vit D deficiency         Consultations This Hospital Stay   NEUROSURGERY ADULT IP CONSULT  MEDICATION HISTORY IP PHARMACY CONSULT  PHYSICAL THERAPY ADULT IP CONSULT  NUTRITION SERVICES ADULT IP CONSULT  NEUROSURGERY ADULT IP CONSULT  PHYSICAL THERAPY ADULT IP CONSULT  OCCUPATIONAL THERAPY ADULT IP CONSULT    Code Status   Full Code    Time Spent on this Encounter   I, Driss Rivas MD, personally saw the patient today and spent greater than 30 minutes discharging this patient.       Driss Rivas MD  [facility] MED SURG  [address] [place] [address]  Phone: [phone]  Fax: [phone]  ______________________________________________________________________    Physical Exam   Vital Signs: Temp: 98  F (36.7  C) Temp src: Oral BP: 135/75 Pulse: 79   Resp: 20 SpO2: 99 % O2 Device: None (Room air)    Weight: 164 lbs 10.94 oz  General Appearance: Appears comfortable.  Respiratory: Without wheezes rhonchi or rales.  CTA  Cardiovascular: RRR, without murmurs  GI: Soft, nontender, plus BS  Skin:  Without obvious bleeding, bruising or excoriations         Primary Care Physician   Samuel Dubose    Discharge Orders      CT Head w/o Contrast       Significant Results and Procedures   Most Recent 3 CBC's:  Recent Labs   Lab Test 07/05/24  0620 07/04/24  0925 07/03/24  0807   WBC 4.1 4.5 5.0   HGB 11.0* 11.9 11.7   MCV 92 92 91    246 259     Most Recent 3 BMP's:  Recent Labs   Lab Test 07/05/24  0732 07/05/24  0428 07/05/24  0015 07/03/24  1151 07/03/24  0807 07/02/24  2156 07/02/24  1124 05/13/17  0733   0000   NA  --   --   --   --  138  --  135 140  --    POTASSIUM  --   --   --   --  4.3  --  5.2 3.5  --    CHLORIDE  --   --   --   --  103  --  99 108  --    CO2  --   --   --   --  24 --  24 23  --    BUN  --   --   --   --  9.5  --  12.6 13  --    CR  --   --   --   --  0.60  --  0.60 0.59  --    ANIONGAP  --   --   --   --  11  --  12 9  --    NAYELI  --   --   --   --  8.9  --  9.2 8.2*  --    * 258* 377*   < > 168*   < > 217* 233*   < >    < > = values in this interval not displayed.     Most Recent 2 LFT's:  Recent Labs   Lab Test 07/02/24  1543 07/02/24  1124 05/11/17  1836   AST 13 33 11   ALT 6  --  12   ALKPHOS  --   --  68   BILITOTAL  --  0.6 0.3       Discharge Medications   Current Discharge Medication List        CONTINUE these medications which have NOT CHANGED    Details   acetaminophen (TYLENOL) 500 MG tablet Take 500-1,000 mg by mouth every 8 hours as needed for mild pain      aspirin 81 MG EC tablet Take 81 mg by mouth daily      atorvastatin (LIPITOR) 40 MG tablet Take 40 mg by mouth daily      cetirizine (ZYRTEC) 10 MG tablet Take 10 mg by mouth daily      glipiZIDE (GLUCOTROL) 10 MG tablet Take 10 mg by mouth 2 times daily (before meals)      loperamide (IMODIUM A-D) 2 MG tablet Take 2 mg by mouth 4 times daily as needed for diarrhea      losartan (COZAAR) 25 MG tablet Take 25 mg by mouth daily      metoprolol succinate ER (TOPROL XL) 25 MG 24 hr tablet Take 25 mg by mouth  daily      omeprazole (PRILOSEC) 20 MG DR capsule Take 20 mg by mouth daily      sitagliptin-metFORMIN (JANUMET)  MG tablet Take 1 tablet by mouth 2 times daily (with meals)           STOP taking these medications       metoprolol (LOPRESSOR) 25 MG tablet Comments:   Reason for Stopping:             Allergies   No Known Allergies

## 2024-07-05 NOTE — PLAN OF CARE
Goal Outcome Evaluation:      Plan of Care Reviewed With: patient    Overall Patient Progress: improving    VSS on RA. Pt is alert and oriented x4, will call appropriately. Up with A1 using walker and gait. L PIV infusing LR @ 50 ml/hr. Pt is continent of bowel and bladder, Blood glucose check Q4h with sliding scale. Pt is daily wt, Family at bedside. No acute event noted this shift, call light within reach. Plan of care ongoing.

## 2024-07-05 NOTE — PLAN OF CARE
Goal Outcome Evaluation:       6MS DISCHARGE    D: Patient discharged to home at 1400. Patient accompanied by family.    I: Discharge prescriptions given to patient. All discharge medications and instructions reviewed with patient and family. Patient instructed to seek care if experiencing worsening symptoms. Other phone numbers to call with questions or concerns after discharge reviewed. PIV removed. Education completed.    A: Patient and family verbalized understanding of discharge medications and instructions.   Belongings remains with patient.    P: Patient to follow-up on care with primary.

## 2024-07-10 ENCOUNTER — TELEPHONE (OUTPATIENT)
Dept: NEUROSURGERY | Facility: CLINIC | Age: 74
End: 2024-07-10
Payer: COMMERCIAL

## 2024-07-13 ENCOUNTER — HEALTH MAINTENANCE LETTER (OUTPATIENT)
Age: 74
End: 2024-07-13

## 2024-07-19 ENCOUNTER — ANCILLARY PROCEDURE (OUTPATIENT)
Dept: CT IMAGING | Facility: CLINIC | Age: 74
End: 2024-07-19
Payer: COMMERCIAL

## 2024-07-19 DIAGNOSIS — I60.9 SUBARACHNOID HEMORRHAGE (H): ICD-10-CM

## 2024-07-19 PROCEDURE — 70450 CT HEAD/BRAIN W/O DYE: CPT | Performed by: RADIOLOGY

## 2024-07-24 ENCOUNTER — OFFICE VISIT (OUTPATIENT)
Dept: NEUROSURGERY | Facility: CLINIC | Age: 74
End: 2024-07-24
Payer: COMMERCIAL

## 2024-07-24 ENCOUNTER — VIRTUAL VISIT (OUTPATIENT)
Dept: INTERPRETER SERVICES | Facility: CLINIC | Age: 74
End: 2024-07-24

## 2024-07-24 VITALS
DIASTOLIC BLOOD PRESSURE: 79 MMHG | WEIGHT: 159.1 LBS | SYSTOLIC BLOOD PRESSURE: 122 MMHG | OXYGEN SATURATION: 97 % | RESPIRATION RATE: 16 BRPM | BODY MASS INDEX: 24.91 KG/M2 | HEART RATE: 89 BPM

## 2024-07-24 DIAGNOSIS — I60.9 SUBARACHNOID HEMORRHAGE (H): Primary | ICD-10-CM

## 2024-07-24 DIAGNOSIS — S06.5XAA SUBDURAL HEMATOMA (H): ICD-10-CM

## 2024-07-24 PROCEDURE — 99213 OFFICE O/P EST LOW 20 MIN: CPT | Performed by: PHYSICIAN ASSISTANT

## 2024-07-24 PROCEDURE — T1013 SIGN LANG/ORAL INTERPRETER: HCPCS | Mod: U4

## 2024-07-24 NOTE — NURSING NOTE
Chief Complaint   Patient presents with    RECHECK     Here for a follow up, confirmed with patient     Janis Allen

## 2024-07-24 NOTE — PROGRESS NOTES
[facility]  Department of Neurosurgery    Name: Jf Schulz  MRN: [id number]  Age: 74 year old  : 2024      Chief Complaint:   Traumatic intracranial hemorrhage, hospital follow up    History of Present Illness:   Jf Schulz is a 74 year old female with a history of type 2 diabetes who is seen today for hospital follow up. She presented to the Emergency Department recently with concerns of persistent headaches following a fall while in [facility] 2024. Head CT at that time was negative. On workup in the ED, imaging revealed a small volume subarachnoid hemorrhage vs subdural hematoma along the right lateral temporal lobe. She was evaluated and managed nonoperatively by Neurosurgery, recommended to have a follow up head CT in 2 weeks. Today in clinic she is here with her son. Our visit was completed with the help of a I-MD  by phone. Jf is feeling well, her headaches have improved. She denies new falls or weakness. She is not taking her daily aspirin, this was held in hospital.     Review of Systems:   Pertinent items are noted in HPI or as in patient entered ROS below, remainder of complete ROS is negative.     Physical Exam:   /79 (BP Location: Right arm, Patient Position: Sitting, Cuff Size: Adult Regular)   Pulse 89   Resp 16   Wt 72.2 kg (159 lb 1.6 oz)   SpO2 97%   BMI 24.91 kg/m    General: No acute distress.    Eyes: Conjunctivae are normal.  MSK: Moves all extremities.  No obvious deformity.  Neuro: The patient is fully oriented. Speech is normal. Facial nerve function is normal, rated as a House Brackmann 1. Gait is normal.   Psych: Normal mood and affect. Behavior is normal.      Imaging:  Head CT done 2024 was reviewed today and compared to previous. This shows a thin, 3mm right temporal convexity subdural hematoma along with evolving subdural collections along the posterior falx and right tentorial leaflet. No new hemorrhage seen.       Assessment:  Traumatic subdural hematoma, hospital follow up    Plan:  We reviewed the head CT findings today which are favorable. I'd like to see her back in 6-8 weeks with repeat imaging. We reviewed warning signs for which she should contact us sooner. Should her Primary Care provider feel she needs to resume aspirin, she would be ok to do so from a Neurosurgery perspective.        Ria Leung PA-C  Department of Neurosurgery

## 2024-07-25 LAB
C AURIS DNA SPEC QL NAA+NON-PROBE: NORMAL
CANDIDA AURIS CONFIRMATION PCR: NEGATIVE

## 2024-07-31 ENCOUNTER — OFFICE VISIT (OUTPATIENT)
Dept: OPTOMETRY | Facility: CLINIC | Age: 74
End: 2024-07-31
Payer: COMMERCIAL

## 2024-07-31 DIAGNOSIS — H57.11 EYE PAIN, RIGHT: Primary | ICD-10-CM

## 2024-07-31 PROCEDURE — 99202 OFFICE O/P NEW SF 15 MIN: CPT

## 2024-07-31 ASSESSMENT — CONF VISUAL FIELD
OS_SUPERIOR_NASAL_RESTRICTION: 0
OS_NORMAL: 1
OD_INFERIOR_TEMPORAL_RESTRICTION: 0
OD_SUPERIOR_TEMPORAL_RESTRICTION: 0
OD_INFERIOR_NASAL_RESTRICTION: 0
OS_INFERIOR_TEMPORAL_RESTRICTION: 0
OD_NORMAL: 1
OD_SUPERIOR_NASAL_RESTRICTION: 0
OS_INFERIOR_NASAL_RESTRICTION: 0
OS_SUPERIOR_TEMPORAL_RESTRICTION: 0

## 2024-07-31 ASSESSMENT — VISUAL ACUITY
OS_SC+: +2
OS_SC: 20/30
OD_SC: 20/30
OD_SC+: -1

## 2024-07-31 ASSESSMENT — CUP TO DISC RATIO
OD_RATIO: 0.4
OS_RATIO: 0.4

## 2024-07-31 ASSESSMENT — TONOMETRY
OS_IOP_MMHG: 13
IOP_METHOD: TONOPEN
OD_IOP_MMHG: 10

## 2024-07-31 ASSESSMENT — SLIT LAMP EXAM - LIDS
COMMENTS: MEIBOMIAN GLAND DYSFUNCTION
COMMENTS: MEIBOMIAN GLAND DYSFUNCTION

## 2024-07-31 ASSESSMENT — EXTERNAL EXAM - RIGHT EYE: OD_EXAM: NORMAL

## 2024-07-31 ASSESSMENT — EXTERNAL EXAM - LEFT EYE: OS_EXAM: NORMAL

## 2024-07-31 NOTE — PROGRESS NOTES
Chief Complaint   Patient presents with    Eye Pain     Right throbbing eye pain for the last 1.5 months. Fell down 2 months ago on the left side. There was blood on the right side of her head when it happened, and since then the right eye has been bothering her. It has been painful opening and closing the eye but there is no report of blurred vision. The pain has slowly been getting better.          Shirley Gold - Optometric Assistant     See Review Of Systems       Medical, surgical and family histories reviewed and updated 7/31/2024.         OBJECTIVE: See Ophthalmology exam    ASSESSMENT:    ICD-10-CM    1. Eye pain, right  H57.11          PLAN:    Patient Instructions   Eye pain, right eye: Intermittent throbbing pain/right-sided headache gradually improving since traumatic SDH.  Reassured patient of intact vision and undilated structural exam.  I do not see ocular etiology for her symptoms; I explained that this may be ongoing due to headache.  I instructed her to follow-up with any worsening/changes.     Complete documentation of historical and exam elements from today's encounter can be found in the full encounter summary report (not reduplicated in this progress note). I personally obtained the chief complaint(s) and history of present illness. I confirmed and edited as necessary the review of systems, past medical/surgical history, family history, social history, and examination findings as document by others; and I examined the patient myself. I personally reviewed the relevant tests, images, and reports as documented above. I formulated and edited as necessary the assessment and plan and discussed the findings and management plan with the patient and family.    Benita Heard OD

## 2024-07-31 NOTE — LETTER
7/31/2024      Jf Schulz  [address]  [facility] [facility] [address]      Dear Colleague,    Thank you for referring your patient, Jf Schulz, to the [facility]. Please see a copy of my visit note below.    Chief Complaint   Patient presents with     Eye Pain     Right throbbing eye pain for the last 1.5 months. Fell down 2 months ago on the left side. There was blood on the right side of her head when it happened, and since then the right eye has been bothering her. It has been painful opening and closing the eye but there is no report of blurred vision. The pain has slowly been getting better.          Shirley Gold - Optometric Assistant     See Review Of Systems       Medical, surgical and family histories reviewed and updated 7/31/2024.         OBJECTIVE: See Ophthalmology exam    ASSESSMENT:    ICD-10-CM    1. Eye pain, right  H57.11          PLAN:    Patient Instructions   Eye pain, right eye: Intermittent throbbing pain/right-sided headache gradually improving since traumatic SDH.  Reassured patient of intact vision and undilated structural exam.  I do not see ocular etiology for her symptoms; I explained that this may be ongoing due to headache.  I instructed her to follow-up with any worsening/changes.     Benita Herad, OD      Again, thank you for allowing me to participate in the care of your patient.        Sincerely,        Benita Heard, OD

## 2024-09-13 ENCOUNTER — ANCILLARY PROCEDURE (OUTPATIENT)
Dept: CT IMAGING | Facility: CLINIC | Age: 74
End: 2024-09-13
Attending: PHYSICIAN ASSISTANT
Payer: COMMERCIAL

## 2024-09-13 ENCOUNTER — OFFICE VISIT (OUTPATIENT)
Dept: NEUROSURGERY | Facility: CLINIC | Age: 74
End: 2024-09-13
Attending: PHYSICIAN ASSISTANT
Payer: COMMERCIAL

## 2024-09-13 VITALS
SYSTOLIC BLOOD PRESSURE: 115 MMHG | RESPIRATION RATE: 16 BRPM | OXYGEN SATURATION: 100 % | HEART RATE: 85 BPM | DIASTOLIC BLOOD PRESSURE: 75 MMHG

## 2024-09-13 DIAGNOSIS — S06.5XAA SUBDURAL HEMATOMA (H): Primary | ICD-10-CM

## 2024-09-13 DIAGNOSIS — I60.9 SUBARACHNOID HEMORRHAGE (H): ICD-10-CM

## 2024-09-13 DIAGNOSIS — S06.5XAA SUBDURAL HEMATOMA (H): ICD-10-CM

## 2024-09-13 PROCEDURE — 70450 CT HEAD/BRAIN W/O DYE: CPT | Performed by: RADIOLOGY

## 2024-09-13 PROCEDURE — T1013 SIGN LANG/ORAL INTERPRETER: HCPCS | Mod: U4

## 2024-09-13 PROCEDURE — 99213 OFFICE O/P EST LOW 20 MIN: CPT | Performed by: PHYSICIAN ASSISTANT

## 2024-09-13 ASSESSMENT — PAIN SCALES - GENERAL: PAINLEVEL: NO PAIN (0)

## 2024-09-13 NOTE — PROGRESS NOTES
[facility]  Department of Neurosurgery    Name: Jf Schulz  MRN: [id number]  Age: 74 year old  : 2024      Chief Complaint:   Traumatic subdural hematoma, follow up visit    History of Present Illness:   Jf Schulz is a 74 year old female with a history of type 2 diabetes who is seen today for hospital follow up. She presented to the Emergency Department with concerns of persistent headaches following a fall while in [facility] 2024. On workup in the ED, imaging revealed a small volume subarachnoid hemorrhage vs subdural hematoma along the right lateral temporal lobe. She was evaluated and managed nonoperatively by Neurosurgery, recommended to have a follow up head CT in 2 weeks. I saw her 2024 and we planned for 6-8 week follow up for which she is here today. She reports that she is feeling well. She's not had any new falls. Her head hurts when she pushes on where she fell and had her laceration, but otherwise she does not get headaches frequently.   Today's visit completed with the help of a Croatian  by phone.     Physical Exam:   /75 (BP Location: Right arm, Patient Position: Sitting)   Pulse 85   Resp 16   SpO2 100%   General: No acute distress.    Eyes: Conjunctivae are normal.  MSK: Moves all extremities.  No obvious deformity.  Neuro: The patient is fully oriented. Speech is normal. Facial nerve function is normal, rated as a House Brackmann 1. Gait is normal.   Psych: Normal mood and affect. Behavior is normal.      Imaging:  We reviewed the head CT done today which reveals resolution of the subdural hematoma seen previously. No new abnormality seen.      Assessment:  Traumatic subdural hematoma, follow up visit    Plan:  We reviewed the head CT results from today which are favorable. She can follow up with Neurosurgery as needed.       Ria Leung PA-C  Department of Neurosurgery

## 2024-09-30 ENCOUNTER — HOSPITAL ENCOUNTER (EMERGENCY)
Facility: CLINIC | Age: 74
Discharge: HOME | End: 2024-09-30
Attending: EMERGENCY MEDICINE | Admitting: EMERGENCY MEDICINE
Payer: COMMERCIAL

## 2024-09-30 ENCOUNTER — APPOINTMENT (OUTPATIENT)
Dept: CT IMAGING | Facility: CLINIC | Age: 74
End: 2024-09-30
Attending: EMERGENCY MEDICINE
Payer: COMMERCIAL

## 2024-09-30 VITALS
OXYGEN SATURATION: 98 % | RESPIRATION RATE: 16 BRPM | TEMPERATURE: 97.9 F | SYSTOLIC BLOOD PRESSURE: 100 MMHG | HEART RATE: 86 BPM | DIASTOLIC BLOOD PRESSURE: 67 MMHG

## 2024-09-30 DIAGNOSIS — S09.90XA RECENT HEAD TRAUMA, INITIAL ENCOUNTER: ICD-10-CM
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
## 2024-09-30 DIAGNOSIS — S00.03XA SCALP HEMATOMA, INITIAL ENCOUNTER: ICD-10-CM

## 2024-09-30 PROCEDURE — 99284 EMERGENCY DEPT VISIT MOD MDM: CPT | Mod: 25 | Performed by: EMERGENCY MEDICINE

## 2024-09-30 PROCEDURE — 99284 EMERGENCY DEPT VISIT MOD MDM: CPT | Performed by: EMERGENCY MEDICINE

## 2024-09-30 PROCEDURE — 70450 CT HEAD/BRAIN W/O DYE: CPT

## 2024-09-30 PROCEDURE — 72125 CT NECK SPINE W/O DYE: CPT

## 2024-09-30 PROCEDURE — 250N000013 HC RX MED GY IP 250 OP 250 PS 637: Performed by: EMERGENCY MEDICINE

## 2024-09-30 RX ORDER — ACETAMINOPHEN 500 MG
1000 TABLET ORAL ONCE
Status: COMPLETED | OUTPATIENT
Start: 2024-09-30 | End: 2024-09-30

## 2024-09-30 RX ADMIN — ACETAMINOPHEN 1000 MG: 500 TABLET ORAL at 20:52

## 2024-09-30 ASSESSMENT — COLUMBIA-SUICIDE SEVERITY RATING SCALE - C-SSRS
1. IN THE PAST MONTH, HAVE YOU WISHED YOU WERE DEAD OR WISHED YOU COULD GO TO SLEEP AND NOT WAKE UP?: NO
6. HAVE YOU EVER DONE ANYTHING, STARTED TO DO ANYTHING, OR PREPARED TO DO ANYTHING TO END YOUR LIFE?: NO
2. HAVE YOU ACTUALLY HAD ANY THOUGHTS OF KILLING YOURSELF IN THE PAST MONTH?: NO

## 2024-09-30 ASSESSMENT — ACTIVITIES OF DAILY LIVING (ADL)
ADLS_ACUITY_SCORE: 36
ADLS_ACUITY_SCORE: 38

## 2024-09-30 NOTE — ED PROVIDER NOTES
"    [facility] EMERGENCY DEPARTMENT ([facility])    9/30/24       ED PROVIDER NOTE    History   No chief complaint on file.    HPI  Jf Schulz is a 74 year old female with a past medical history of atrial fibrillation anticoagulated warfarin, hypomagnesemia, ischemic left MCA stroke, osteoarthritis of left knee, type 2 diabetes, and vitamin D deficiency, who presents to the ED for evaluation of fall. ***      Past Medical History  Past Medical History:   Diagnosis Date    Atrial fibrillation (H)     Diabetes mellitus (H)      Past Surgical History:   Procedure Laterality Date    ENDOBRONCHIAL ULTRASOUND FLEXIBLE N/A 5/12/2017    Procedure: ENDOBRONCHIAL ULTRASOUND FLEXIBLE;;  Surgeon: Khurram Medeiros MD;  Location: [facility]     acetaminophen (TYLENOL) 325 MG tablet  atorvastatin (LIPITOR) 40 MG tablet  blood glucose (NO BRAND SPECIFIED) test strip  blood glucose monitoring (NO BRAND SPECIFIED) meter device kit  cetirizine (ZYRTEC) 10 MG tablet  glipiZIDE (GLUCOTROL) 10 MG tablet  loperamide (IMODIUM A-D) 2 MG tablet  losartan (COZAAR) 25 MG tablet  metoprolol tartrate (LOPRESSOR) 25 MG tablet  omeprazole (PRILOSEC) 20 MG DR capsule  sitagliptin-metFORMIN (JANUMET)  MG tablet  thin (NO BRAND SPECIFIED) lancets      No Known Allergies  Family History  No family history on file.  Social History   Social History     Tobacco Use    Smoking status: Never   Substance Use Topics    Alcohol use: No    Drug use: No      A complete review of systems was performed with pertinent positives and negatives noted in the HPI, and all other systems negative.    Physical Exam      Physical Exam  ***    ED Course, Procedures, & Data      Procedures       {ED Course Selections (Optional):067482}  {ED Sepsis CMS Documentation (Optional):931573::\" \"}       No results found for any visits on 09/30/24.  Medications - No data to display  Labs Ordered and Resulted from Time of ED Arrival to Time of ED Departure - No data to " display  No orders to display          {Critical Care Performed?:156413}    Assessment & Plan    ***    I have reviewed the nursing notes. I have reviewed the findings, diagnosis, plan and need for follow up with the patient.    New Prescriptions    No medications on file       Final diagnoses:   None       ***  [facility] EMERGENCY DEPARTMENT  9/30/2024

## 2024-09-30 NOTE — ED PROVIDER NOTES
History     Chief Complaint   Patient presents with    Fall     Fell in bathroom right before coming.  Hit head, no LOC, family denies blood thinning medication     HPI  Jf Schulz is a 74 year old female with a past medical history of atrial fibrillation previously anticoagulated on warfarin, hypomagnesemia, ischemic left MCA stroke, osteoarthritis of left knee, type 2 diabetes, and vitamin D deficiency  who presents to the emergency department with a chief complaint of fall.  Patient fell in the bathroom right before coming into the emergency department.  She did hit her head.  No loss of consciousness.  The patient is not currently on blood thinners.  Patient does have significant swelling to the back of her scalp.  No laceration/abrasion.  No numbness, weakness, tingling, difficulty with speech or ambulation.  No nausea or vomiting.    The patient presents to the emergency department today accompanied by her niece, who helps interpret from [place] per patient request.    I have reviewed the Medications, Allergies, Past Medical and Surgical History, and Social History in the [employer] system.    Past Medical History:   Diagnosis Date    Atrial fibrillation (H)     Diabetes mellitus (H)      Past Surgical History:   Procedure Laterality Date    ENDOBRONCHIAL ULTRASOUND FLEXIBLE N/A 5/12/2017    Procedure: ENDOBRONCHIAL ULTRASOUND FLEXIBLE;;  Surgeon: Khurram Medeiros MD;  Location: [facility]     No current facility-administered medications for this encounter.     Current Outpatient Medications   Medication Sig Dispense Refill    acetaminophen (TYLENOL) 325 MG tablet Take 2 tablets (650 mg) by mouth every 4 hours as needed for mild pain or other (and adjunct with moderate or severe pain or per patient request) 30 tablet 0    atorvastatin (LIPITOR) 40 MG tablet Take 40 mg by mouth daily      blood glucose (NO BRAND SPECIFIED) test strip Use to test blood sugar 4 times daily or as directed. To accompany: Blood  Glucose Monitor Brands: per insurance. 100 strip 6    blood glucose monitoring (NO BRAND SPECIFIED) meter device kit Use to test blood sugar 4 times daily or as directed. Preferred blood glucose meter OR supplies to accompany: Blood Glucose Monitor Brands: per insurance. 1 kit 0    cetirizine (ZYRTEC) 10 MG tablet Take 10 mg by mouth daily      glipiZIDE (GLUCOTROL) 10 MG tablet Take 10 mg by mouth 2 times daily (before meals)      loperamide (IMODIUM A-D) 2 MG tablet Take 2 mg by mouth 4 times daily as needed for diarrhea      losartan (COZAAR) 25 MG tablet Take 25 mg by mouth daily      metoprolol tartrate (LOPRESSOR) 25 MG tablet Take 0.5 tablets (12.5 mg) by mouth 2 times daily 30 tablet 0    omeprazole (PRILOSEC) 20 MG DR capsule Take 20 mg by mouth daily      sitagliptin-metFORMIN (JANUMET)  MG tablet Take 1 tablet by mouth 2 times daily (with meals)      thin (NO BRAND SPECIFIED) lancets Use with lanceting device. To accompany: Blood Glucose Monitor Brands: per insurance. 100 each 6     No Known Allergies  Past medical history, past surgical history, medications, and allergies were reviewed with the patient. Additional pertinent items: None    Social History     Socioeconomic History    Marital status:      Spouse name: Not on file    Number of children: Not on file    Years of education: Not on file    Highest education level: Not on file   Occupational History    Not on file   Tobacco Use    Smoking status: Never    Smokeless tobacco: Not on file   Substance and Sexual Activity    Alcohol use: No    Drug use: No    Sexual activity: Not on file   Other Topics Concern    Not on file   Social History Narrative    Not on file     Social Determinants of Health     Financial Resource Strain: Not on File (4/20/2021)    Received from [employer]    Financial Resource Strain     Financial Resource Strain: 0   Food Insecurity: Not on File (4/20/2021)    Received from [employer]    Food Insecurity     Food: 0    Transportation Needs: Not on File (2021)    Received from [employer]    Transportation Needs     Transportation: 0   Physical Activity: Not on File (2021)    Received from [employer]    Physical Activity     Physical Activity: 0   Stress: Not on File (2021)    Received from [employer]    Stress     Stress: 0   Social Connections: Not on File (2021)    Received from [employer]    Social Connections     Social Connections and Isolation: 0   Interpersonal Safety: Not on file   Housing Stability: Not on File (2021)    Received from [employer]    Housing Stability     Housin     Social history was reviewed with the patient. Additional pertinent items: None    Review of Systems  A medically appropriate review of systems was performed with pertinent positives and negatives noted in the HPI, and all other systems negative.    Physical Exam   BP: 100/67  Pulse: 86  Temp: 97.9  F (36.6  C)  Resp: 16  SpO2: 98 %      General: Well nourished, well developed, NAD  HEENT: EOMI, anicteric.  Area of swelling to posterior scalp consistent with hematoma, MMM  Neck: no jugular venous distension, supple, nl ROM  Cardiac: Regular rate, extremities appear well-perfused  Pulm: NLB, normal RR  Skin: Warm and dry to the touch.  No rash  Extremities: No LE edema, no cyanosis, w/w/p  Neuro: A&Ox3, no gross focal deficits    ED Course        Procedures                        Labs Ordered and Resulted from Time of ED Arrival to Time of ED Departure - No data to display         Results for orders placed or performed during the hospital encounter of 24 (from the past 24 hour(s))   CT Head w/o Contrast    Narrative    EXAM: CT HEAD W/O CONTRAST, CT CERVICAL SPINE W/O CONTRAST  LOCATION: [facility]  DATE: 2024    INDICATION: Head and neck injury  COMPARISON: CT head  2024  TECHNIQUE:   1) Routine CT Head without IV contrast. Multiplanar reformats. Dose reduction techniques were  used.  2) Routine CT Cervical Spine without IV contrast. Multiplanar reformats. Dose reduction techniques were used.    FINDINGS:   HEAD CT:   INTRACRANIAL CONTENTS: No intracranial hemorrhage, extraaxial collection, or mass effect.  Small chronic infarction left precentral gyrus. Moderate presumed chronic small vessel ischemic changes. Mild generalized volume loss. No hydrocephalus.     VISUALIZED ORBITS/SINUSES/MASTOIDS: No intraorbital abnormality. No paranasal sinus mucosal disease. No middle ear or mastoid effusion.    BONES/SOFT TISSUES: Mild soft tissue swelling/hematoma overlying posterior skull left of the midline.    CERVICAL SPINE CT:   VERTEBRA: Normal vertebral body heights. Mild focal lordosis at C6-7. Slight right cervical curve. No fracture or posttraumatic subluxation.     CANAL/FORAMINA: No canal or neural foraminal stenosis.    PARASPINAL: No extraspinal abnormality. Visualized lung fields are clear.      Impression    IMPRESSION:  HEAD CT:  1.  No acute intracranial process.    CERVICAL SPINE CT:  1.  No CT evidence for acute fracture or post traumatic subluxation.   CT Cervical Spine w/o Contrast    Narrative    EXAM: CT HEAD W/O CONTRAST, CT CERVICAL SPINE W/O CONTRAST  LOCATION: [facility]  DATE: 9/30/2024    INDICATION: Head and neck injury  COMPARISON: CT head  13 2024  TECHNIQUE:   1) Routine CT Head without IV contrast. Multiplanar reformats. Dose reduction techniques were used.  2) Routine CT Cervical Spine without IV contrast. Multiplanar reformats. Dose reduction techniques were used.    FINDINGS:   HEAD CT:   INTRACRANIAL CONTENTS: No intracranial hemorrhage, extraaxial collection, or mass effect.  Small chronic infarction left precentral gyrus. Moderate presumed chronic small vessel ischemic changes. Mild generalized volume loss. No hydrocephalus.     VISUALIZED ORBITS/SINUSES/MASTOIDS: No intraorbital abnormality. No paranasal sinus  mucosal disease. No middle ear or mastoid effusion.    BONES/SOFT TISSUES: Mild soft tissue swelling/hematoma overlying posterior skull left of the midline.    CERVICAL SPINE CT:   VERTEBRA: Normal vertebral body heights. Mild focal lordosis at C6-7. Slight right cervical curve. No fracture or posttraumatic subluxation.     CANAL/FORAMINA: No canal or neural foraminal stenosis.    PARASPINAL: No extraspinal abnormality. Visualized lung fields are clear.      Impression    IMPRESSION:  HEAD CT:  1.  No acute intracranial process.    CERVICAL SPINE CT:  1.  No CT evidence for acute fracture or post traumatic subluxation.       Labs, vital signs, and imaging studies were reviewed by me.    Medications   acetaminophen (TYLENOL) tablet 1,000 mg (has no administration in time range)       Assessments & Plan (with Medical Decision Making)   Jf Schulz is a 74 year old female who presents to the emergency department after a fall, with head trauma.  Large area of swelling to the back of patient's scalp consistent with hematoma.  Differential diagnosis also includes skull fracture, ICH, C-spine fracture.  CTs ordered to further evaluate the patient in the emergency department.    CTs are negative for acute traumatic injury.    Critical care was not performed.     Medical Decision Making  The patient's presentation was of high complexity (an acute health issue posing potential threat to life or bodily function).    The patient's evaluation involved:  ordering and/or review of 2 test(s) in this encounter (see separate area of note for details)  independent interpretation of testing performed by another health professional (see separate area of note for details)    The patient's management necessitated moderate risk (prescription drug management including medications given in the ED) and moderate risk (limitations due to social determinants of health (non-English-speaking patient)).    CT images were personally reviewed by me,  I agree with the radiology reads.    I have reviewed the nursing notes.    I have reviewed the findings, diagnosis, plan and need for follow up with the patient.    Patient to be discharged home. Advised to follow up with PCP within 1 week. To return to ER immediately with any new/worsening symptoms. Plan of care discussed with patient who expresses understanding and agrees with plan of care.    New Prescriptions    No medications on file       Final diagnoses:   Fall at home, initial encounter   Recent head trauma, initial encounter   Scalp hematoma, initial encounter       KOLE LOWE MD  9/30/2024   [facility] EMERGENCY DEPARTMENT       Kole Lowe MD  09/30/24 2052       Kole Lowe MD  09/30/24 2053

## 2024-09-30 NOTE — ED TRIAGE NOTES
Triage Assessment (Adult)       Row Name 09/30/24 9801          Triage Assessment    Airway WDL WDL        Respiratory WDL    Respiratory WDL WDL        Skin Circulation/Temperature WDL    Skin Circulation/Temperature WDL WDL        Cardiac WDL    Cardiac WDL WDL        Peripheral/Neurovascular WDL    Peripheral Neurovascular WDL WDL        Cognitive/Neuro/Behavioral WDL    Cognitive/Neuro/Behavioral WDL WDL

## 2024-10-01 NOTE — DISCHARGE INSTRUCTIONS
TODAY'S VISIT:  You were seen today for fall   -   - If you had any labs or imaging/radiology tests performed today, you should also discuss these tests with your usual provider.     FOLLOW-UP:  Please make an appointment to follow up with:  - Your Primary Care Provider. If you do not have a PCP, please call the Primary Care Center (phone: [phone] for an appointment    - Have your provider review the results from today's visit with you again to make sure no further follow-up or additional testing is needed based on those results.     RETURN TO THE EMERGENCY DEPARTMENT  Return to the Emergency Department at any time for any new or worsening symptoms or any concerns.

## 2024-11-30 ENCOUNTER — HEALTH MAINTENANCE LETTER (OUTPATIENT)
Age: 74
End: 2024-11-30

## 2025-03-01 ENCOUNTER — HOSPITAL ENCOUNTER (EMERGENCY)
Facility: CLINIC | Age: 75
Discharge: HOME | End: 2025-03-01
Attending: STUDENT IN AN ORGANIZED HEALTH CARE EDUCATION/TRAINING PROGRAM | Admitting: STUDENT IN AN ORGANIZED HEALTH CARE EDUCATION/TRAINING PROGRAM
Payer: COMMERCIAL

## 2025-03-01 ENCOUNTER — APPOINTMENT (OUTPATIENT)
Dept: CT IMAGING | Facility: CLINIC | Age: 75
End: 2025-03-01
Attending: STUDENT IN AN ORGANIZED HEALTH CARE EDUCATION/TRAINING PROGRAM
Payer: COMMERCIAL

## 2025-03-01 VITALS
RESPIRATION RATE: 18 BRPM | HEIGHT: 66 IN | TEMPERATURE: 97.7 F | SYSTOLIC BLOOD PRESSURE: 129 MMHG | HEART RATE: 66 BPM | OXYGEN SATURATION: 99 % | DIASTOLIC BLOOD PRESSURE: 84 MMHG | WEIGHT: 160 LBS | BODY MASS INDEX: 25.71 KG/M2

## 2025-03-01 DIAGNOSIS — M79.601 PAIN OF RIGHT UPPER EXTREMITY: ICD-10-CM

## 2025-03-01 DIAGNOSIS — R07.89 RIGHT-SIDED CHEST WALL PAIN: ICD-10-CM

## 2025-03-01 LAB
ALBUMIN SERPL BCG-MCNC: 3.8 G/DL (ref 3.5–5.2)
ALP SERPL-CCNC: 61 U/L (ref 40–150)
ALT SERPL W P-5'-P-CCNC: 13 U/L (ref 0–50)
ANION GAP SERPL CALCULATED.3IONS-SCNC: 12 MMOL/L (ref 7–15)
AST SERPL W P-5'-P-CCNC: 16 U/L (ref 0–45)
ATRIAL RATE - MUSE: 75 BPM
BASOPHILS # BLD AUTO: 0 10E3/UL (ref 0–0.2)
BASOPHILS NFR BLD AUTO: 1 %
BILIRUB SERPL-MCNC: 0.4 MG/DL
BUN SERPL-MCNC: 20.8 MG/DL (ref 8–23)
CALCIUM SERPL-MCNC: 9.2 MG/DL (ref 8.8–10.4)
CHLORIDE SERPL-SCNC: 102 MMOL/L (ref 98–107)
CREAT SERPL-MCNC: 0.67 MG/DL (ref 0.51–0.95)
DIASTOLIC BLOOD PRESSURE - MUSE: NORMAL MMHG
EGFRCR SERPLBLD CKD-EPI 2021: >90 ML/MIN/1.73M2
EOSINOPHIL # BLD AUTO: 0.1 10E3/UL (ref 0–0.7)
EOSINOPHIL NFR BLD AUTO: 2 %
ERYTHROCYTE [DISTWIDTH] IN BLOOD BY AUTOMATED COUNT: 13.2 % (ref 10–15)
GLUCOSE SERPL-MCNC: 244 MG/DL (ref 70–99)
HCO3 SERPL-SCNC: 22 MMOL/L (ref 22–29)
HCT VFR BLD AUTO: 37.1 % (ref 35–47)
HGB BLD-MCNC: 12.1 G/DL (ref 11.7–15.7)
IMM GRANULOCYTES # BLD: 0 10E3/UL
IMM GRANULOCYTES NFR BLD: 0 %
INR PPP: 0.93 (ref 0.85–1.15)
INTERPRETATION ECG - MUSE: NORMAL
LYMPHOCYTES # BLD AUTO: 1.8 10E3/UL (ref 0.8–5.3)
LYMPHOCYTES NFR BLD AUTO: 44 %
MCH RBC QN AUTO: 29.1 PG (ref 26.5–33)
MCHC RBC AUTO-ENTMCNC: 32.6 G/DL (ref 31.5–36.5)
MCV RBC AUTO: 89 FL (ref 78–100)
MONOCYTES # BLD AUTO: 0.4 10E3/UL (ref 0–1.3)
MONOCYTES NFR BLD AUTO: 11 %
NEUTROPHILS # BLD AUTO: 1.8 10E3/UL (ref 1.6–8.3)
NEUTROPHILS NFR BLD AUTO: 43 %
NRBC # BLD AUTO: 0 10E3/UL
NRBC BLD AUTO-RTO: 0 /100
P AXIS - MUSE: 52 DEGREES
PLATELET # BLD AUTO: 158 10E3/UL (ref 150–450)
POTASSIUM SERPL-SCNC: 4.1 MMOL/L (ref 3.4–5.3)
PR INTERVAL - MUSE: 160 MS
PROT SERPL-MCNC: 7 G/DL (ref 6.4–8.3)
QRS DURATION - MUSE: 84 MS
QT - MUSE: 396 MS
QTC - MUSE: 442 MS
R AXIS - MUSE: 13 DEGREES
RBC # BLD AUTO: 4.16 10E6/UL (ref 3.8–5.2)
SODIUM SERPL-SCNC: 136 MMOL/L (ref 135–145)
SYSTOLIC BLOOD PRESSURE - MUSE: NORMAL MMHG
T AXIS - MUSE: 64 DEGREES
VENTRICULAR RATE- MUSE: 75 BPM
WBC # BLD AUTO: 4.1 10E3/UL (ref 4–11)

## 2025-03-01 PROCEDURE — 85004 AUTOMATED DIFF WBC COUNT: CPT | Performed by: STUDENT IN AN ORGANIZED HEALTH CARE EDUCATION/TRAINING PROGRAM

## 2025-03-01 PROCEDURE — 250N000009 HC RX 250: Performed by: STUDENT IN AN ORGANIZED HEALTH CARE EDUCATION/TRAINING PROGRAM

## 2025-03-01 PROCEDURE — 71260 CT THORAX DX C+: CPT

## 2025-03-01 PROCEDURE — 99285 EMERGENCY DEPT VISIT HI MDM: CPT | Performed by: STUDENT IN AN ORGANIZED HEALTH CARE EDUCATION/TRAINING PROGRAM

## 2025-03-01 PROCEDURE — 93005 ELECTROCARDIOGRAM TRACING: CPT | Performed by: STUDENT IN AN ORGANIZED HEALTH CARE EDUCATION/TRAINING PROGRAM

## 2025-03-01 PROCEDURE — 36415 COLL VENOUS BLD VENIPUNCTURE: CPT | Performed by: STUDENT IN AN ORGANIZED HEALTH CARE EDUCATION/TRAINING PROGRAM

## 2025-03-01 PROCEDURE — 84155 ASSAY OF PROTEIN SERUM: CPT | Performed by: STUDENT IN AN ORGANIZED HEALTH CARE EDUCATION/TRAINING PROGRAM

## 2025-03-01 PROCEDURE — 93010 ELECTROCARDIOGRAM REPORT: CPT | Performed by: STUDENT IN AN ORGANIZED HEALTH CARE EDUCATION/TRAINING PROGRAM

## 2025-03-01 PROCEDURE — 85610 PROTHROMBIN TIME: CPT | Performed by: STUDENT IN AN ORGANIZED HEALTH CARE EDUCATION/TRAINING PROGRAM

## 2025-03-01 PROCEDURE — 82565 ASSAY OF CREATININE: CPT | Performed by: STUDENT IN AN ORGANIZED HEALTH CARE EDUCATION/TRAINING PROGRAM

## 2025-03-01 PROCEDURE — 250N000011 HC RX IP 250 OP 636: Performed by: STUDENT IN AN ORGANIZED HEALTH CARE EDUCATION/TRAINING PROGRAM

## 2025-03-01 RX ORDER — IOPAMIDOL 755 MG/ML
100 INJECTION, SOLUTION INTRAVASCULAR ONCE
Status: COMPLETED | OUTPATIENT
Start: 2025-03-01 | End: 2025-03-01

## 2025-03-01 RX ADMIN — SODIUM CHLORIDE 52 ML: 9 INJECTION, SOLUTION INTRAVENOUS at 14:04

## 2025-03-01 RX ADMIN — IOPAMIDOL 76 ML: 755 INJECTION, SOLUTION INTRAVENOUS at 14:00

## 2025-03-01 ASSESSMENT — ACTIVITIES OF DAILY LIVING (ADL)
ADLS_ACUITY_SCORE: 58

## 2025-03-01 NOTE — ED TRIAGE NOTES
Pt reports Rt arm/shoulder pain started about 2 days ago,no known injury per pt        Triage Assessment (Adult)       Row Name 03/01/25 1101          Triage Assessment    Airway WDL WDL        Respiratory WDL    Respiratory WDL WDL        Skin Circulation/Temperature WDL    Skin Circulation/Temperature WDL WDL        Cardiac WDL    Cardiac WDL WDL        Peripheral/Neurovascular WDL    Peripheral Neurovascular WDL WDL        Cognitive/Neuro/Behavioral WDL    Cognitive/Neuro/Behavioral WDL WDL

## 2025-03-01 NOTE — DISCHARGE INSTRUCTIONS
Your workup today was reassuring  We have made you follow-up with thoracic surgery which I encourage you to follow-up with I also made you follow-up with a primary care doctor  If you have any new or worsening symptoms please return immediately to the emergency department

## 2025-03-01 NOTE — ED PROVIDER NOTES
"ED Provider Note  [facility]      History     Chief Complaint   Patient presents with    Arm Pain     Pt reports Rt arm/shoulder pain started about 2 days ago       Arm Pain    Jf Schulz is a 74 year old female with a history of atrial fibrillation, hypomagnesemia, ischemic left MCA stroke, osteoarthritis.  Left knee, DMII, and vitamin D deficiency who presents to the emergency department for right clavicle upper chest pain.  Patient states that pain began 10 days ago, and has stayed constant.  Pain is worsened with movement of her right arm.  No shortness of breath, no discussion with patient no clayton chest pain, reports her pain is on her right clavicle which she notices slightly bigger than her left, and only when she moves her arm above her head.          Physical Exam   BP: 129/84  Pulse: 66  Temp: 97.7  F (36.5  C)  Resp: 18  Height: 167.6 cm (5' 6\")  Weight: 72.6 kg (160 lb)  SpO2: 99 %  Physical Exam  Constitutional:       General: She is not in acute distress.     Appearance: Normal appearance. She is not diaphoretic.   HENT:      Head: Atraumatic.      Mouth/Throat:      Mouth: Mucous membranes are moist.   Eyes:      General: No scleral icterus.     Conjunctiva/sclera: Conjunctivae normal.   Cardiovascular:      Rate and Rhythm: Normal rate.      Heart sounds: Normal heart sounds. No murmur heard.     Comments: Increased prominence of right clavicle as compared to left with tenderness to palpation of upper right chest wall with no skin changes  Pulmonary:      Effort: No respiratory distress.      Breath sounds: Normal breath sounds. No stridor. No wheezing, rhonchi or rales.   Chest:      Chest wall: No tenderness.   Abdominal:      General: Abdomen is flat.   Musculoskeletal:      Cervical back: Neck supple.      Comments: No tenderness to palpation of shoulder, good range of motion, neurovascularly intact in bilateral upper extremities   Skin:     General: Skin is warm. "      Findings: No rash.   Neurological:      Mental Status: She is alert.           ED Course, Procedures, & Data      Procedures            EKG Interpretation:      Interpreted by Israel Gamboa MD  Time reviewed: 1202  Symptoms at time of EKG: Upper right chest pain   Rhythm: normal sinus   Rate: 76 bpm  Axis: normal  Ectopy: none  Conduction: normal  ST Segments/ T Waves: No ST-T wave changes  Q Waves: none  Comparison to prior: No old EKG available    Clinical Impression: normal sinus rhythm with no acute ischemia           No results found for any visits on 03/01/25.  Medications - No data to display  Labs Ordered and Resulted from Time of ED Arrival to Time of ED Departure - No data to display  No orders to display          Critical care was not performed.     Medical Decision Making  The patient's presentation was of high complexity (an acute health issue posing potential threat to life or bodily function).    The patient's evaluation involved:  review of external note(s) from 3+ sources (see separate area of note for details)  review of 3+ test result(s) ordered prior to this encounter (see separate area of note for details)  ordering and/or review of 3+ test(s) in this encounter (see separate area of note for details)  discussion of management or test interpretation with another health professional (vascular surgery)    The patient's management necessitated high risk (order and review of multiple labs, chest CT, discussion with surgery, coordination of care and disposition home).    Assessment & Plan    In the emergency department the patient's labs as reviewed and interpreted by me were reassuring, and as she had a reassuring EKG, and her history did not seem consistent with ACS, less likely ACS.  Initially considered potential rotator cuff strain, but patient's discomfort was in her right clavicle to just distal on her right upper chest so ordered CT scan.  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, but no active infection, and did not show any acute abnormalities  Discussed case with thoracic surgery who recommended follow-up with thoracic surgery, for likely CT scan, but no other acute intervention  I went back and spoke with the patient and patient's daughter, and as she continued to have no chest discomfort, no shortness of breath, is otherwise well-appearing, would like to discharge home, feels comfortable with plan, will discharge her home with follow-up with thoracic surgery, primary care follow-up although she does have a primary care visit with [facility], and strict return precaution to come back to the emergency room for any new or worsening symptoms.    I have reviewed the nursing notes. I have reviewed the findings, diagnosis, plan and need for follow up with the patient.    New Prescriptions    No medications on file       Final diagnoses:   None   I, Dani Monique, am serving as a trained medical scribe to document services personally performed by Israel Gamboa MD, based to the provider's statements to me.     IIsrael MD, was physically present and have reviewed and verified the accuracy of this note documented by Dani Monique.     Israel Gamboa MD  [facility] EMERGENCY DEPARTMENT  3/1/2025     Israel Gamboa MD  03/01/25 [id number]

## 2025-03-03 ENCOUNTER — PATIENT OUTREACH (OUTPATIENT)
Dept: ONCOLOGY | Facility: CLINIC | Age: 75
End: 2025-03-03
Payer: COMMERCIAL

## 2025-03-03 DIAGNOSIS — R07.89 RIGHT-SIDED CHEST WALL PAIN: Primary | ICD-10-CM

## 2025-03-03 NOTE — PROGRESS NOTES
New Patient Oncology Nurse Navigator Note     Referring provider: Dr. Israel Gamboa    Referring Clinic/Organization: [facility]  Referred to: Thoracic Surgery  Requested provider (if applicable): First available - did not specify   Referral Received: 03/03/25       Evaluation for :   Diagnosis   R07.89 (ICD-10-CM) - Right-sided chest wall pain     Clinical History (per Nurse review of records provided):      06/01/2017: Previously seen by Dr. Vinson in clinic    08/04/2017: Procedure - Rt thoracotomy drain and debride bronchogenic cyst (Care Everywhere)    03/01/2025 CT Chest w/ contrast (bookmarked) showed:   IMPRESSION:   1.  No acute abnormalities to account for symptoms of right sided chest pain.  2.  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 Follow-up contrast-enhanced CT chest could be performed in 3 months to confirm stability.    Clinical Assessment / Barriers to Care (Per Nurse):  Tobacco History    Smoking Status  Never     Records Location: [facility]   Records Needed: None  Additional testing needed prior to consult: PFTs  Referral updates and Plan:     3/3: Writer called Jf with the assistance of the [employer]  to discuss the referral. There was no answer. Unable to leave a voicemail.     KEVIN MeeksN, RN, OCN  [facility] Oncology Nurse Navigator  [phone] / [phone]

## 2025-03-09 ENCOUNTER — HEALTH MAINTENANCE LETTER (OUTPATIENT)
Age: 75
End: 2025-03-09

## 2025-06-27 PROBLEM — H57.9 ITCHY EYES: Status: ACTIVE | Noted: 2025-06-27

## 2025-06-27 PROBLEM — H04.123 DRY EYES: Status: ACTIVE | Noted: 2025-06-27

## 2025-06-27 PROBLEM — H52.4 MYOPIA WITH ASTIGMATISM AND PRESBYOPIA, BILATERAL: Status: ACTIVE | Noted: 2025-06-27

## 2025-06-27 PROBLEM — E11.9 TYPE 2 DIABETES MELLITUS WITHOUT COMPLICATION, WITHOUT LONG-TERM CURRENT USE OF INSULIN (H): Status: ACTIVE | Noted: 2025-06-27

## 2025-06-27 PROBLEM — H52.203 MYOPIA WITH ASTIGMATISM AND PRESBYOPIA, BILATERAL: Status: ACTIVE | Noted: 2025-06-27

## 2025-06-27 PROBLEM — H02.402 PTOSIS, LEFT EYELID: Status: ACTIVE | Noted: 2025-06-27

## 2025-06-27 PROBLEM — H25.813 COMBINED FORMS OF AGE-RELATED CATARACT OF BOTH EYES: Status: ACTIVE | Noted: 2025-06-27

## 2025-06-27 PROBLEM — H52.13 MYOPIA WITH ASTIGMATISM AND PRESBYOPIA, BILATERAL: Status: ACTIVE | Noted: 2025-06-27

## 2025-06-28 ENCOUNTER — HEALTH MAINTENANCE LETTER (OUTPATIENT)
Age: 75
End: 2025-06-28

## 2025-07-19 ENCOUNTER — HEALTH MAINTENANCE LETTER (OUTPATIENT)
Age: 75
End: 2025-07-19

## (undated) RX ORDER — FENTANYL CITRATE 50 UG/ML
INJECTION, SOLUTION INTRAMUSCULAR; INTRAVENOUS
Status: DISPENSED
Start: 2017-05-12

## (undated) RX ORDER — LIDOCAINE HYDROCHLORIDE 10 MG/ML
INJECTION, SOLUTION EPIDURAL; INFILTRATION; INTRACAUDAL; PERINEURAL
Status: DISPENSED
Start: 2017-05-12